# Patient Record
Sex: FEMALE | Race: WHITE | NOT HISPANIC OR LATINO | Employment: FULL TIME | URBAN - METROPOLITAN AREA
[De-identification: names, ages, dates, MRNs, and addresses within clinical notes are randomized per-mention and may not be internally consistent; named-entity substitution may affect disease eponyms.]

---

## 2017-03-28 PROBLEM — Z72.0 TOBACCO ABUSE: Status: ACTIVE | Noted: 2017-03-28

## 2017-07-08 PROCEDURE — 96372 THER/PROPH/DIAG INJ SC/IM: CPT

## 2017-07-08 PROCEDURE — 99284 EMERGENCY DEPT VISIT MOD MDM: CPT | Mod: 25

## 2017-07-09 ENCOUNTER — HOSPITAL ENCOUNTER (EMERGENCY)
Facility: HOSPITAL | Age: 22
Discharge: HOME | End: 2017-07-09
Attending: EMERGENCY MEDICINE
Payer: COMMERCIAL

## 2017-07-09 VITALS
SYSTOLIC BLOOD PRESSURE: 121 MMHG | OXYGEN SATURATION: 99 % | WEIGHT: 148 LBS | HEIGHT: 64 IN | TEMPERATURE: 98 F | DIASTOLIC BLOOD PRESSURE: 71 MMHG | BODY MASS INDEX: 25.27 KG/M2 | RESPIRATION RATE: 18 BRPM | HEART RATE: 72 BPM

## 2017-07-09 DIAGNOSIS — N76.0 BACTERIAL VAGINOSIS: Primary | ICD-10-CM

## 2017-07-09 DIAGNOSIS — B96.89 BACTERIAL VAGINOSIS: Primary | ICD-10-CM

## 2017-07-09 DIAGNOSIS — R10.2 PELVIC PAIN IN FEMALE: ICD-10-CM

## 2017-07-09 LAB
B-HCG UR QL: NEGATIVE
BACTERIA GENITAL QL WET PREP: ABNORMAL
BILIRUB UR QL STRIP: NEGATIVE
CLARITY UR: CLEAR
CLUE CELLS VAG QL WET PREP: ABNORMAL
COLOR UR: YELLOW
CTP QC/QA: YES
FILAMENT FUNGI VAG WET PREP-#/AREA: ABNORMAL
GLUCOSE UR QL STRIP: NEGATIVE
HGB UR QL STRIP: NEGATIVE
KETONES UR QL STRIP: NEGATIVE
LEUKOCYTE ESTERASE UR QL STRIP: NEGATIVE
NITRITE UR QL STRIP: NEGATIVE
PH UR STRIP: 5 [PH] (ref 5–8)
PROT UR QL STRIP: NEGATIVE
SP GR UR STRIP: 1.02 (ref 1–1.03)
SPECIMEN SOURCE: ABNORMAL
T VAGINALIS GENITAL QL WET PREP: ABNORMAL
URN SPEC COLLECT METH UR: NORMAL
UROBILINOGEN UR STRIP-ACNC: NEGATIVE EU/DL
WBC #/AREA VAG WET PREP: ABNORMAL
YEAST GENITAL QL WET PREP: ABNORMAL

## 2017-07-09 PROCEDURE — 87591 N.GONORRHOEAE DNA AMP PROB: CPT

## 2017-07-09 PROCEDURE — 63600175 PHARM REV CODE 636 W HCPCS: Performed by: NURSE PRACTITIONER

## 2017-07-09 PROCEDURE — 81025 URINE PREGNANCY TEST: CPT | Performed by: EMERGENCY MEDICINE

## 2017-07-09 PROCEDURE — 81003 URINALYSIS AUTO W/O SCOPE: CPT

## 2017-07-09 PROCEDURE — 87210 SMEAR WET MOUNT SALINE/INK: CPT

## 2017-07-09 RX ORDER — CEFTRIAXONE 500 MG/1
500 INJECTION, POWDER, FOR SOLUTION INTRAMUSCULAR; INTRAVENOUS
Status: COMPLETED | OUTPATIENT
Start: 2017-07-09 | End: 2017-07-09

## 2017-07-09 RX ORDER — DOXYCYCLINE 100 MG/1
100 CAPSULE ORAL 2 TIMES DAILY
Qty: 28 CAPSULE | Refills: 0 | Status: SHIPPED | OUTPATIENT
Start: 2017-07-09 | End: 2017-07-23

## 2017-07-09 RX ADMIN — CEFTRIAXONE SODIUM 500 MG: 500 INJECTION, POWDER, FOR SOLUTION INTRAMUSCULAR; INTRAVENOUS at 03:07

## 2017-07-09 NOTE — ED TRIAGE NOTES
Pt c/o lower abd pain x1 1/2 weeks, has had ovarian cysts in the past and states the pain is similar

## 2017-07-09 NOTE — DISCHARGE INSTRUCTIONS
Follow-up with your OB/GYN in the next several days for further evaluation and management.     Take antibiotic as prescribed until it is gone even if symptoms improve.    Return the emergency department for any new or worsening symptoms.

## 2017-07-10 LAB
C TRACH DNA SPEC QL NAA+PROBE: NOT DETECTED
N GONORRHOEA DNA SPEC QL NAA+PROBE: NOT DETECTED

## 2017-09-08 ENCOUNTER — HOSPITAL ENCOUNTER (OUTPATIENT)
Dept: PREADMISSION TESTING | Facility: HOSPITAL | Age: 22
Discharge: HOME | End: 2017-09-08
Attending: OBSTETRICS & GYNECOLOGY
Payer: COMMERCIAL

## 2017-09-08 VITALS
SYSTOLIC BLOOD PRESSURE: 103 MMHG | RESPIRATION RATE: 17 BRPM | BODY MASS INDEX: 26.88 KG/M2 | TEMPERATURE: 97 F | WEIGHT: 157.44 LBS | HEART RATE: 78 BPM | DIASTOLIC BLOOD PRESSURE: 70 MMHG | HEIGHT: 64 IN | OXYGEN SATURATION: 98 %

## 2017-09-08 DIAGNOSIS — Z01.818 PRE-OP TESTING: Primary | ICD-10-CM

## 2017-09-08 DIAGNOSIS — R10.2 PELVIC PAIN IN FEMALE: ICD-10-CM

## 2017-09-08 LAB
BASOPHILS # BLD AUTO: 0.05 K/UL
BASOPHILS NFR BLD: 0.6 %
DIFFERENTIAL METHOD: ABNORMAL
EOSINOPHIL # BLD AUTO: 0.4 K/UL
EOSINOPHIL NFR BLD: 5.5 %
ERYTHROCYTE [DISTWIDTH] IN BLOOD BY AUTOMATED COUNT: 11.9 %
HCT VFR BLD AUTO: 41.1 %
HGB BLD-MCNC: 14.3 G/DL
LYMPHOCYTES # BLD AUTO: 1.9 K/UL
LYMPHOCYTES NFR BLD: 23.5 %
MCH RBC QN AUTO: 31.6 PG
MCHC RBC AUTO-ENTMCNC: 34.8 G/DL
MCV RBC AUTO: 91 FL
MONOCYTES # BLD AUTO: 0.8 K/UL
MONOCYTES NFR BLD: 10.2 %
NEUTROPHILS # BLD AUTO: 4.9 K/UL
NEUTROPHILS NFR BLD: 60.1 %
PLATELET # BLD AUTO: 303 K/UL
PMV BLD AUTO: 10.2 FL
RBC # BLD AUTO: 4.52 M/UL
WBC # BLD AUTO: 8.07 K/UL

## 2017-09-08 PROCEDURE — 36415 COLL VENOUS BLD VENIPUNCTURE: CPT

## 2017-09-08 PROCEDURE — 85025 COMPLETE CBC W/AUTO DIFF WBC: CPT

## 2017-09-08 NOTE — DISCHARGE INSTRUCTIONS
Your surgery is scheduled for _Wednesday Sept 20, 2017___________________.    Call [phone] between 2 p.m. and 5 p.m. on   _Tuesday__ 9--18-17____________ to find out your arrival time for the day of your surgery.      Please report to SAME DAY SURGERY UNIT on the 2nd FLOOR at _______ a.m.  Use front door entrance. The doors open at 0530 am.          INSTRUCTIONS IMPORTANT!!!  ¨ Do not eat or drink after 12 midnight-including water. OK to brush teeth, no   gum, candy or mints!              _x___  Return to Hospital Lab on _Monday 9-18-17 at 9:00AM_____________for additional blood test.    _x___  Prep instructions:    SHOWER    _x___  Please shower using Hibiclens soap the night before AND  the morning of  your surgery/procedure. Do not use Hibiclens on your face or genitals       x        If your surgery is around your belly button (Navel) be sure to wash inside your  belly button also. Rinse hibiclens off completely.  _x___  No shaving of procedural area at least 4-5 days before surgery due to increased risk of skin irritation and/or possible infection.  _x___  Do not wear makeup, including mascara. WEARING EYE MAKEUP MAY  LEAD TO SERIOUS EYE INJURY during surgery.  _x___  No powder, lotions or creams to your body.  _x___  You may wear only deodorant on the day of surgery.  _x___  Please remove all jewelry, including piercings and leave at home.  _x___  No money or valuables needed. Please leave at home.  You may bring your cell phone.    _x___  If going home the same day, arrange for a ride home. You will not be able to drive if Anesthesia was used.    _x___  Wear loose fitting clothing. Allow for dressings, bandages.  x____  Stop Aspirin, Ibuprofen, Motrin and Aleve at least -5 days before surgery, unless otherwise instructed by your doctor, or the nurse.              You MAY use Tylenol/acetaminophen until day of surgery.  _x___  Call MD for temperature above 101 degrees.        _x___ Stop taking any Fish Oil  supplement or any Vitamins that contain Vitamin  E at least 5 days prior to surgery.              I have read or had read and explained to me, and understand the above information.  Additional comments or instructions:Please call   [phone] if you have any questions regarding the instructions above.

## 2017-09-08 NOTE — PRE-PROCEDURE INSTRUCTIONS
Pre-operative instructions, medication directives and pain scales reviewed with patient. Instructed to wash with Hibiclens prior to surgery as directed.  All questions the patient had  were answered. Re-assurance about surgical procedure and day of surgery routine given as needed. The patient verbalized understanding of the pre-op instructions.

## 2017-09-18 ENCOUNTER — LAB VISIT (OUTPATIENT)
Dept: LAB | Facility: HOSPITAL | Age: 22
End: 2017-09-18
Attending: OBSTETRICS & GYNECOLOGY
Payer: COMMERCIAL

## 2017-09-18 DIAGNOSIS — Z01.818 PRE-OP TESTING: ICD-10-CM

## 2017-09-18 LAB
ABO + RH BLD: NORMAL
BLD GP AB SCN CELLS X3 SERPL QL: NORMAL
HCG INTACT+B SERPL-ACNC: <1.2 MIU/ML

## 2017-09-18 PROCEDURE — 36415 COLL VENOUS BLD VENIPUNCTURE: CPT

## 2017-09-18 PROCEDURE — 86870 RBC ANTIBODY IDENTIFICATION: CPT

## 2017-09-18 PROCEDURE — 84702 CHORIONIC GONADOTROPIN TEST: CPT

## 2017-09-18 PROCEDURE — 86850 RBC ANTIBODY SCREEN: CPT

## 2017-09-18 PROCEDURE — 86900 BLOOD TYPING SEROLOGIC ABO: CPT | Mod: 91

## 2017-09-18 PROCEDURE — 86900 BLOOD TYPING SEROLOGIC ABO: CPT

## 2017-09-18 PROCEDURE — 86880 COOMBS TEST DIRECT: CPT

## 2017-09-18 PROCEDURE — 86905 BLOOD TYPING RBC ANTIGENS: CPT

## 2017-09-18 PROCEDURE — 86901 BLOOD TYPING SEROLOGIC RH(D): CPT | Mod: 91

## 2017-09-19 ENCOUNTER — ANESTHESIA EVENT (OUTPATIENT)
Dept: SURGERY | Facility: HOSPITAL | Age: 22
End: 2017-09-19
Payer: COMMERCIAL

## 2017-09-20 ENCOUNTER — SURGERY (OUTPATIENT)
Age: 22
End: 2017-09-20

## 2017-09-20 ENCOUNTER — ANESTHESIA (OUTPATIENT)
Dept: SURGERY | Facility: HOSPITAL | Age: 22
End: 2017-09-20
Payer: COMMERCIAL

## 2017-09-20 ENCOUNTER — HOSPITAL ENCOUNTER (OUTPATIENT)
Facility: HOSPITAL | Age: 22
Discharge: HOME | End: 2017-09-20
Attending: OBSTETRICS & GYNECOLOGY | Admitting: OBSTETRICS & GYNECOLOGY
Payer: COMMERCIAL

## 2017-09-20 VITALS
SYSTOLIC BLOOD PRESSURE: 107 MMHG | OXYGEN SATURATION: 98 % | WEIGHT: 157.38 LBS | BODY MASS INDEX: 26.87 KG/M2 | HEIGHT: 64 IN | TEMPERATURE: 98 F | RESPIRATION RATE: 16 BRPM | HEART RATE: 63 BPM | DIASTOLIC BLOOD PRESSURE: 79 MMHG

## 2017-09-20 DIAGNOSIS — R10.2 PELVIC PAIN IN FEMALE: ICD-10-CM

## 2017-09-20 PROCEDURE — 71000039 HC RECOVERY, EACH ADD'L HOUR: Performed by: OBSTETRICS & GYNECOLOGY

## 2017-09-20 PROCEDURE — 71000015 HC POSTOP RECOV 1ST HR: Performed by: OBSTETRICS & GYNECOLOGY

## 2017-09-20 PROCEDURE — 71000033 HC RECOVERY, INTIAL HOUR: Performed by: OBSTETRICS & GYNECOLOGY

## 2017-09-20 PROCEDURE — 25000003 PHARM REV CODE 250: Performed by: ANESTHESIOLOGY

## 2017-09-20 PROCEDURE — 36000709 HC OR TIME LEV III EA ADD 15 MIN: Performed by: OBSTETRICS & GYNECOLOGY

## 2017-09-20 PROCEDURE — 63600175 PHARM REV CODE 636 W HCPCS: Performed by: ANESTHESIOLOGY

## 2017-09-20 PROCEDURE — D9220A PRA ANESTHESIA: Mod: CRNA,,, | Performed by: NURSE ANESTHETIST, CERTIFIED REGISTERED

## 2017-09-20 PROCEDURE — 71000016 HC POSTOP RECOV ADDL HR: Performed by: OBSTETRICS & GYNECOLOGY

## 2017-09-20 PROCEDURE — 63600175 PHARM REV CODE 636 W HCPCS: Performed by: NURSE ANESTHETIST, CERTIFIED REGISTERED

## 2017-09-20 PROCEDURE — 37000009 HC ANESTHESIA EA ADD 15 MINS: Performed by: OBSTETRICS & GYNECOLOGY

## 2017-09-20 PROCEDURE — 25000003 PHARM REV CODE 250: Performed by: NURSE ANESTHETIST, CERTIFIED REGISTERED

## 2017-09-20 PROCEDURE — 25000003 PHARM REV CODE 250: Performed by: OBSTETRICS & GYNECOLOGY

## 2017-09-20 PROCEDURE — 37000008 HC ANESTHESIA 1ST 15 MINUTES: Performed by: OBSTETRICS & GYNECOLOGY

## 2017-09-20 PROCEDURE — D9220A PRA ANESTHESIA: Mod: ANES,,, | Performed by: ANESTHESIOLOGY

## 2017-09-20 PROCEDURE — 36000708 HC OR TIME LEV III 1ST 15 MIN: Performed by: OBSTETRICS & GYNECOLOGY

## 2017-09-20 RX ORDER — SODIUM CHLORIDE 0.9 % (FLUSH) 0.9 %
3 SYRINGE (ML) INJECTION
Status: DISCONTINUED | OUTPATIENT
Start: 2017-09-20 | End: 2017-09-20 | Stop reason: HOSPADM

## 2017-09-20 RX ORDER — MIDAZOLAM HYDROCHLORIDE 1 MG/ML
INJECTION, SOLUTION INTRAMUSCULAR; INTRAVENOUS
Status: DISCONTINUED | OUTPATIENT
Start: 2017-09-20 | End: 2017-09-20

## 2017-09-20 RX ORDER — DIPHENHYDRAMINE HCL 25 MG
25 CAPSULE ORAL EVERY 4 HOURS PRN
Status: DISCONTINUED | OUTPATIENT
Start: 2017-09-20 | End: 2017-09-20 | Stop reason: HOSPADM

## 2017-09-20 RX ORDER — ONDANSETRON 8 MG/1
8 TABLET, ORALLY DISINTEGRATING ORAL EVERY 8 HOURS PRN
Status: DISCONTINUED | OUTPATIENT
Start: 2017-09-20 | End: 2017-09-20 | Stop reason: HOSPADM

## 2017-09-20 RX ORDER — PROPOFOL 10 MG/ML
VIAL (ML) INTRAVENOUS
Status: DISCONTINUED | OUTPATIENT
Start: 2017-09-20 | End: 2017-09-20

## 2017-09-20 RX ORDER — SODIUM CHLORIDE, SODIUM LACTATE, POTASSIUM CHLORIDE, CALCIUM CHLORIDE 600; 310; 30; 20 MG/100ML; MG/100ML; MG/100ML; MG/100ML
INJECTION, SOLUTION INTRAVENOUS CONTINUOUS
Status: DISCONTINUED | OUTPATIENT
Start: 2017-09-20 | End: 2017-09-20 | Stop reason: HOSPADM

## 2017-09-20 RX ORDER — OXYCODONE AND ACETAMINOPHEN 10; 325 MG/1; MG/1
1 TABLET ORAL EVERY 4 HOURS PRN
Status: DISCONTINUED | OUTPATIENT
Start: 2017-09-20 | End: 2017-09-20 | Stop reason: HOSPADM

## 2017-09-20 RX ORDER — ONDANSETRON 2 MG/ML
INJECTION INTRAMUSCULAR; INTRAVENOUS
Status: DISCONTINUED | OUTPATIENT
Start: 2017-09-20 | End: 2017-09-20

## 2017-09-20 RX ORDER — HYDROMORPHONE HYDROCHLORIDE 2 MG/ML
0.2 INJECTION, SOLUTION INTRAMUSCULAR; INTRAVENOUS; SUBCUTANEOUS EVERY 5 MIN PRN
Status: DISCONTINUED | OUTPATIENT
Start: 2017-09-20 | End: 2017-09-20 | Stop reason: HOSPADM

## 2017-09-20 RX ORDER — OXYCODONE AND ACETAMINOPHEN 5; 325 MG/1; MG/1
1 TABLET ORAL EVERY 4 HOURS PRN
Qty: 12 TABLET | Refills: 0 | Status: SHIPPED | OUTPATIENT
Start: 2017-09-20 | End: 2017-09-27

## 2017-09-20 RX ORDER — GLYCOPYRROLATE 0.2 MG/ML
INJECTION INTRAMUSCULAR; INTRAVENOUS
Status: DISCONTINUED | OUTPATIENT
Start: 2017-09-20 | End: 2017-09-20

## 2017-09-20 RX ORDER — HYDROCODONE BITARTRATE AND ACETAMINOPHEN 5; 325 MG/1; MG/1
1 TABLET ORAL EVERY 4 HOURS PRN
Status: DISCONTINUED | OUTPATIENT
Start: 2017-09-20 | End: 2017-09-20 | Stop reason: HOSPADM

## 2017-09-20 RX ORDER — NEOSTIGMINE METHYLSULFATE 1 MG/ML
INJECTION, SOLUTION INTRAVENOUS
Status: DISCONTINUED | OUTPATIENT
Start: 2017-09-20 | End: 2017-09-20

## 2017-09-20 RX ORDER — LIDOCAINE HCL/PF 100 MG/5ML
SYRINGE (ML) INTRAVENOUS
Status: DISCONTINUED | OUTPATIENT
Start: 2017-09-20 | End: 2017-09-20

## 2017-09-20 RX ORDER — DIPHENHYDRAMINE HYDROCHLORIDE 50 MG/ML
25 INJECTION INTRAMUSCULAR; INTRAVENOUS EVERY 4 HOURS PRN
Status: DISCONTINUED | OUTPATIENT
Start: 2017-09-20 | End: 2017-09-20 | Stop reason: HOSPADM

## 2017-09-20 RX ORDER — LIDOCAINE HYDROCHLORIDE 10 MG/ML
1 INJECTION, SOLUTION EPIDURAL; INFILTRATION; INTRACAUDAL; PERINEURAL ONCE
Status: DISCONTINUED | OUTPATIENT
Start: 2017-09-20 | End: 2017-09-20 | Stop reason: HOSPADM

## 2017-09-20 RX ORDER — BUPIVACAINE HYDROCHLORIDE AND EPINEPHRINE 2.5; 5 MG/ML; UG/ML
INJECTION, SOLUTION EPIDURAL; INFILTRATION; INTRACAUDAL; PERINEURAL
Status: DISCONTINUED | OUTPATIENT
Start: 2017-09-20 | End: 2017-09-20 | Stop reason: HOSPADM

## 2017-09-20 RX ORDER — ROCURONIUM BROMIDE 10 MG/ML
INJECTION, SOLUTION INTRAVENOUS
Status: DISCONTINUED | OUTPATIENT
Start: 2017-09-20 | End: 2017-09-20

## 2017-09-20 RX ORDER — IBUPROFEN 600 MG/1
600 TABLET ORAL EVERY 6 HOURS PRN
Status: DISCONTINUED | OUTPATIENT
Start: 2017-09-20 | End: 2017-09-20 | Stop reason: HOSPADM

## 2017-09-20 RX ORDER — FENTANYL CITRATE 50 UG/ML
INJECTION, SOLUTION INTRAMUSCULAR; INTRAVENOUS
Status: DISCONTINUED | OUTPATIENT
Start: 2017-09-20 | End: 2017-09-20

## 2017-09-20 RX ORDER — METOCLOPRAMIDE HYDROCHLORIDE 5 MG/ML
INJECTION INTRAMUSCULAR; INTRAVENOUS
Status: DISCONTINUED | OUTPATIENT
Start: 2017-09-20 | End: 2017-09-20

## 2017-09-20 RX ORDER — SUCCINYLCHOLINE CHLORIDE 20 MG/ML
INJECTION INTRAMUSCULAR; INTRAVENOUS
Status: DISCONTINUED | OUTPATIENT
Start: 2017-09-20 | End: 2017-09-20

## 2017-09-20 RX ADMIN — FENTANYL CITRATE 100 MCG: 50 INJECTION INTRAMUSCULAR; INTRAVENOUS at 07:09

## 2017-09-20 RX ADMIN — ROCURONIUM BROMIDE 25 MG: 10 INJECTION, SOLUTION INTRAVENOUS at 07:09

## 2017-09-20 RX ADMIN — LIDOCAINE HYDROCHLORIDE 50 MG: 20 INJECTION, SOLUTION INTRAVENOUS at 07:09

## 2017-09-20 RX ADMIN — HYDROMORPHONE HYDROCHLORIDE 0.2 MG: 2 INJECTION, SOLUTION INTRAMUSCULAR; INTRAVENOUS; SUBCUTANEOUS at 08:09

## 2017-09-20 RX ADMIN — NEOSTIGMINE METHYLSULFATE 2.5 MG: 1 INJECTION INTRAVENOUS at 08:09

## 2017-09-20 RX ADMIN — ONDANSETRON 4 MG: 2 INJECTION, SOLUTION INTRAMUSCULAR; INTRAVENOUS at 06:09

## 2017-09-20 RX ADMIN — BUPIVACAINE HYDROCHLORIDE AND EPINEPHRINE BITARTRATE 7 ML: 2.5; .0091 INJECTION, SOLUTION EPIDURAL; INFILTRATION; INTRACAUDAL; PERINEURAL at 08:09

## 2017-09-20 RX ADMIN — SODIUM CHLORIDE, SODIUM LACTATE, POTASSIUM CHLORIDE, AND CALCIUM CHLORIDE: .6; .31; .03; .02 INJECTION, SOLUTION INTRAVENOUS at 07:09

## 2017-09-20 RX ADMIN — GLYCOPYRROLATE 0.2 MG: 0.2 INJECTION, SOLUTION INTRAMUSCULAR; INTRAVENOUS at 06:09

## 2017-09-20 RX ADMIN — ROCURONIUM BROMIDE 5 MG: 10 INJECTION, SOLUTION INTRAVENOUS at 07:09

## 2017-09-20 RX ADMIN — MIDAZOLAM HYDROCHLORIDE 2 MG: 1 INJECTION, SOLUTION INTRAMUSCULAR; INTRAVENOUS at 06:09

## 2017-09-20 RX ADMIN — METOCLOPRAMIDE 10 MG: 5 INJECTION, SOLUTION INTRAMUSCULAR; INTRAVENOUS at 06:09

## 2017-09-20 RX ADMIN — FENTANYL CITRATE 50 MCG: 50 INJECTION INTRAMUSCULAR; INTRAVENOUS at 07:09

## 2017-09-20 RX ADMIN — PROPOFOL 150 MG: 10 INJECTION, EMULSION INTRAVENOUS at 07:09

## 2017-09-20 RX ADMIN — SODIUM CHLORIDE, SODIUM LACTATE, POTASSIUM CHLORIDE, AND CALCIUM CHLORIDE: .6; .31; .03; .02 INJECTION, SOLUTION INTRAVENOUS at 06:09

## 2017-09-20 RX ADMIN — FENTANYL CITRATE 50 MCG: 50 INJECTION INTRAMUSCULAR; INTRAVENOUS at 08:09

## 2017-09-20 RX ADMIN — SUCCINYLCHOLINE CHLORIDE 100 MG: 20 INJECTION, SOLUTION INTRAMUSCULAR; INTRAVENOUS at 07:09

## 2017-09-20 RX ADMIN — GLYCOPYRROLATE 0.2 MG: 0.2 INJECTION, SOLUTION INTRAMUSCULAR; INTRAVENOUS at 08:09

## 2017-09-20 RX ADMIN — HYDROCODONE BITARTRATE AND ACETAMINOPHEN 1 TABLET: 5; 325 TABLET ORAL at 12:09

## 2017-09-20 NOTE — OP NOTE
GYN Operative Report    Date of Procedure: 17    Preoperative Diagonsis: Chronic pelvic pain    Postoperative Diagnosis: Same    Surgeon: Dr Anayeli Aden MD    Assistant: Dr Reggie Betts MD    Procedure: Diagnostic laparoscopy with lysis of adhesions     Indications: Ms Robins is a 23 yo  with chronic pelvic pain who has failed medical management.  She desired definitive surgical diagnosis.    EBL: < 5  cc    Findings: Exam under anesthesia revealed a normal sized uterus and adnexa with normal mobility.  Diagnostic laparoscopy revealed thin omental adhesions to the midline abdomen and in the right upper quadrant.  Thin adhesions in the lower uterine segment L>R. No endometriosis seen.    PROCEDURE DETAILS:   After giving informed consent, the patient was taken to the operating room with IV fluids running, and SCDs for DVT prophylaxis.  She was prepped and draped in the normal sterile fashion in the dorsal lithotomy position with adjustable Alpesh-type stirrups. Delvalle catheter was placed. A weighted speculum was placed. Cervix was identified, grasped with single-tooth tenaculum, and a Upsidelka uterine manipulator was inserted without difficulty. Gloves were changed.     Attention was turned to the abdomen. A 5 mm skin incision was made in the umbilicus. Upward traction was placed in the anterior abdominal wall. Veress needle was inserted. Double click was heard. Saline was noted to freely flow through the syringe. Beginning pressure of less than 5 mm Hg was obtained. Pneumoperitoneum was then established at pressure of 15 mmHg. Veress needle was removed. Upward traction was again placed in the anterior abdominal wall and a 10 mm trocar was placed into the abdomen under direct visualization with the laparoscope. Correct intraperitoneal placement was confirmed.  The abdominal wall, the vasculature and bowel under the insertion site was inspected and found to be free of any damage.  The abdomen and liver were noted to  have the above findings.  The patient was placed in Trendelenburg. The above mentioned pelvic findings were made.  A 5 mm trocar was placed in the lower right abdomen under direct laparoscopic visualization.  Using the Ace Harmonic Scalpel the omental adhesions in the abdomen were freed from the anterior abdominal wall.  Good hemostasis was noted.  All instruments were removed from the patient's abdomen.    Pneumoperitoneum was deflated and the skin was closed with 4-0 Moncryl.  All instruments were removed from the patient's uterus, vagina and bladder.    The patient tolerated the procedure well. All counts were correct. The patient was taken to Recovery Room in stable condition.

## 2017-09-20 NOTE — H&P (VIEW-ONLY)
History and Physical - GYN    Subjective:     Ms Robins is a 21 yo  who presents today for pre-operative evaluation for a diagnostic laparoscopy and possible resection of endometriosis.    Ms Robins has a long history of pelvic pain.  She has been on the Mirena, NuvaRing and combined OCPs, with relief initially, and then subsequently it stops working.  She does not have a histologic diagnosis of endometriosis, however, her clinical picture supports this diagnosis.    Review of Systems  Nine system ROS negative except for HPI    History reviewed. No pertinent past medical history.  Past Surgical History:   Procedure Laterality Date    APPENDECTOMY       SECTION      x2    CHOLECYSTECTOMY       OB History      Para Term  AB Living    2 2 2     2    SAB TAB Ectopic Multiple Live Births                     Social History     Social History    Marital status: Single     Spouse name: N/A    Number of children: N/A    Years of education: N/A     Occupational History    Not on file.     Social History Main Topics    Smoking status: Current Every Day Smoker     Packs/day: 0.50     Types: Cigarettes    Smokeless tobacco: Never Used    Alcohol use No    Drug use: No    Sexual activity: Yes     Partners: Male     Other Topics Concern    Not on file     Social History Narrative    No narrative on file     Family History   Problem Relation Age of Onset    Breast cancer Neg Hx     Colon cancer Neg Hx     Ovarian cancer Neg Hx        (Not in a hospital admission)  Review of patient's allergies indicates:  No Known Allergies     Objective:   Vitals: reviewed    General: no acute distress, alert and oriented to person, place and time  HEENT: head atraumatic, normocephalic, moist mucous membranes, neck supple, no large thyroid masses, no cervical or clavicular lymphadenopathy  Abdomen: non-distended, normoactive bowel sounds, non-tender to palpation, no masses, rebound or guarding  Incision(s):  umbilicus, RUQx3, RLQ open,  x2  Extremities: calves non-tender to palpation, no calf edema, erythema or palpable cords  Breasts: deferred    Assessment:     Encounter Diagnosis   Name Primary?    Pelvic pain in female Yes       Plan:   Long discussion had today with the patient in regards to her diagnosis of pelvic pain.  She understands that with most scenarios, these cases may be managed expectantly, medically or surgically.  She has failed multiple medical managements as outlined above and desires definitive surgical management.  She understands that with any surgical procedure there are many risks, including, but not limited to: bleeding, infection, damage to surrounding tissue(s), scar formation, need for additional procedure(s), fistula formation, blood clot formation in the form of DVT/PE, risk of myocardial infarction, brain trauma, death, anesthesia complication, so on and so forth. She understands the alternatives to this procedure are conservative management and medical management, however, desires to proceed.  Consents reviewed and signed today.      Contraception: OCPs    To the operating room for laparoscopy with possible resection of endometriosis and/or any indicated procedure.  With this procedure we are trying to obtain a diagnosis and hopefully get her some symptomatic relief of her pain.  She  Understands that if she has endometriosis this surgery is not definitive.  She understands that this is a diagnosis that spans her reproductive lifetime.  Additionally, with her many other surgeries, she understands there are increased risk of scar and/or damage to surrounding abdominal and pelvic structures.      To the OR  SCDs

## 2017-09-20 NOTE — DISCHARGE SUMMARY
Discharge Summary - GYN    Diagnosis: Chronic pelvic pain    Patient was admitted for her planned diagnostic laparoscopy.  She underwent a diagnostic laparoscopy with lysis of adhesions.  Please see H&P and operative report for full details.  She was transferred to the PACU and once she meets criteria, she will be discharged to home.    Disposition: Home    Condition: Stable    Diet: Regular    Medications:  Current Discharge Medication List      START taking these medications    Details   oxycodone-acetaminophen (PERCOCET) 5-325 mg per tablet Take 1 tablet by mouth every 4 (four) hours as needed for Pain.  Qty: 12 tablet, Refills: 0         CONTINUE these medications which have NOT CHANGED    Details   ibuprofen (ADVIL,MOTRIN) 800 MG tablet Take 1 tablet (800 mg total) by mouth every 8 (eight) hours as needed for Pain.  Qty: 30 tablet, Refills: 1      norgestimate-ethinyl estradiol (ORTHO-CYCLEN) 0.25-35 mg-mcg per tablet Take 1 tablet by mouth once daily.  Qty: 28 tablet, Refills: 11    Associated Diagnoses: Pelvic pain in female; Encounter for contraceptive management, unspecified type      triamcinolone acetonide 0.1% (KENALOG) 0.1 % cream Apply topically 2 (two) times daily.  Qty: 45 g, Refills: 1         STOP taking these medications       etonogestrel-ethinyl estradiol (NUVARING) 0.12-0.015 mg/24 hr vaginal ring Comments:   Reason for Stopping:               Activity: Pelvic rest, no heavy lifting    Follow-up: 1 week as already scheduled    Precautions: Patient counseled to return or call for temperature greater than 100.4, copious foul- smelling vaginal discharge, profuse vaginal bleeding, extreme pain, nausea or vomiting, wound breakdown, or any concerns.      Discharge Procedure Orders  Diet general

## 2017-09-20 NOTE — DISCHARGE INSTRUCTIONS
Last dose of pain medication given at 12:04 PM  BATHING:                   You may shower after your dressing is removed, but no tub baths, hot tubs, saunas or swimming until you see the doctor.    DRESSING:  ? Remove your bandage ____24____________. If there are skin tapes over the incision, leave them in place. They will start to come off in 5-7 days.  ACTIVITY LEVEL: If you have received sedation or an anesthetic, you may feel sleepy for   several hours. Rest until you are more awake. Gradually resume your normal activities  ? No heavy lifting or straining, nothing over 10 lbs., like a gallon of milk.  ? Pelvic rest- no sex, tampons or douching until follow up or instructed by doctor.  Activity: Pelvic rest, no heavy lifting     Follow-up: 1 week as already scheduled   Precautions-  call for temperature greater than 100.4, copious foul- smelling vaginal discharge, profuse vaginal bleeding, extreme pain, nausea or vomiting, wound breakdown, or any concerns.  DIET:  You may resume your home diet. If nausea is present, increase your diet gradually with fluids and bland foods.    Medications:  Pain medication should be taken only if needed and as directed. If antibiotics are prescribed, the medication should be taken until completed. You will be given an updated list of you medications.  ? No driving, alcoholic beverages or signing legal documents for next 24 hours or while taking pain medication    CALL THE DOCTOR:    For any obvious bleeding (some dried blood over the incision is normal).      Redness, swelling, foul smell around incision or fever over 101.   Shortness of breath, Coughing up Bloody Sputum or Pains or Swelling in your calves.   Persistent pain or nausea not relieved by medication.   If vaginal bleeding is in excess of a normal period.   Problems urinating    If any unusual problems or difficulties occur contact your doctor. If you cannot contact your doctor but feel your signs and symptoms  warrant a physicians attention return to the emergency room.  Fall Prevention  Millions of people fall every year and injure themselves. You may have had anesthesia or sedation which may increase your risk of falling. You may have health issues that put you at an increased risk of falling.     Here are ways to reduce your risk of falling.  ·   · Make your home safe by keeping walkways clear of objects you may trip over.  · Use non-slip pads under rugs. Do not use area rugs or small throw rugs.  · Use non-slip mats in bathtubs and showers.  · Install handrails and lights on staircases.  · Do not walk in poorly lit areas.  · Do not stand on chairs or wobbly ladders.  · Use caution when reaching overhead or looking upward. This position can cause a loss of balance.  · Be sure your shoes fit properly, have non-slip bottoms and are in good condition.   · Wear shoes both inside and out. Avoid going barefoot or wearing slippers.  · Be cautious when going up and down stairs, curbs, and when walking on uneven sidewalks.  · If your balance is poor, consider using a cane or walker.  · If your fall was related to alcohol use, stop or limit alcohol intake.   · If your fall was related to use of sleeping medicines, talk to your doctor about this. You may need to reduce your dosage at bedtime if you awaken during the night to go to the bathroom.    · To reduce the need for nighttime bathroom trips:  ¨ Avoid drinking fluids for several hours before going to bed  ¨ Empty your bladder before going to bed  ¨ Men can keep a urinal at the bedside  · Stay as active as you can. Balance, flexibility, strength, and endurance all come from exercise. They all play a role in preventing falls. Ask your healthcare provider which types of activity are right for you.  · Get your vision checked on a regular basis.  · If you have pets, know where they are before you stand up or walk so you don't trip over them.  · Use night lights.

## 2017-09-20 NOTE — INTERVAL H&P NOTE
The patient has been seen and examined and there are no changes to her H&P.  Ms Robins is a 21 yo  with h/o chronic pelvic pain and suspected endometriosis.  Her history is significant for  x2, appendectomy and cholescytectomy.    To the OR for laparoscopy for possible scar/endometrisos resection.  SCDs

## 2017-09-21 LAB — BLOOD GROUP ANTIBODIES SERPL: NORMAL

## 2017-09-21 NOTE — ANESTHESIA POSTPROCEDURE EVALUATION
"Anesthesia Post Evaluation    Patient: Aspen Robins    Procedure(s) Performed: Procedure(s) (LRB):  Lysis of Adhesions (N/A)    Final Anesthesia Type: general  Patient location during evaluation: PACU  Patient participation: Yes- Able to Participate  Level of consciousness: awake and alert  Post-procedure vital signs: reviewed and stable  Pain management: adequate  Airway patency: patent  PONV status at discharge: No PONV  Anesthetic complications: no      Cardiovascular status: blood pressure returned to baseline and hemodynamically stable  Respiratory status: unassisted  Hydration status: euvolemic  Follow-up not needed.        Visit Vitals  /79   Pulse 63   Temp 36.6 °C (97.8 °F) (Oral)   Resp 16   Ht 5' 4" (1.626 m)   Wt 71.4 kg (157 lb 6 oz)   LMP 08/20/2017 (Exact Date)   SpO2 98%   Breastfeeding? No   BMI 27.01 kg/m²       Pain/Kirsty Score: Pain Assessment Performed: Yes (9/20/2017 12:31 PM)  Presence of Pain: complains of pain/discomfort (9/20/2017 12:31 PM)  Pain Rating Prior to Med Admin: 5 (9/20/2017 12:31 PM)  Pain Rating Post Med Admin: 4 (9/20/2017 12:31 PM)  Kirsty Score: 10 (9/20/2017 12:31 PM)  Modified Kirsty Score: 19 (9/20/2017 12:31 PM)      "

## 2017-09-21 NOTE — ANESTHESIA PREPROCEDURE EVALUATION
09/21/2017  Aspen Robins is a 22 y.o., female.    Anesthesia Evaluation     I have reviewed the Nursing Notes.      Review of Systems  Social:  Non-Smoker   Cardiovascular:  Cardiovascular Normal Exercise tolerance: good     Pulmonary:  Pulmonary Normal    Renal/:  Renal/ Normal     Hepatic/GI:  Hepatic/GI Normal No Bowel Prep.    Neurological:  Neurology Normal    Endocrine:  Endocrine Normal        Physical Exam  General:  Well nourished    Airway/Jaw/Neck:  AIRWAY FINDINGS: Normal           Mental Status:  Mental Status Findings: Normal        Anesthesia Plan  Type of Anesthesia, risks & benefits discussed:  Anesthesia Type:  general  Patient's Preference:   Intra-op Monitoring Plan: standard ASA monitors  Intra-op Monitoring Plan Comments:   Post Op Pain Control Plan:   Post Op Pain Control Plan Comments:   Induction:   IV  Beta Blocker:  Patient is not currently on a Beta-Blocker (No further documentation required).       Informed Consent: Patient understands risks and agrees with Anesthesia plan.  Questions answered. Anesthesia consent signed with patient.  ASA Score: 2     Day of Surgery Review of History & Physical:    H&P update referred to the surgeon.         Ready For Surgery From Anesthesia Perspective.

## 2017-10-09 LAB — DAT IGG-SP REAG RBC-IMP: NORMAL

## 2017-11-15 LAB — PATHOLOGIST INTERPRETATION AB/XM: NORMAL

## 2019-07-27 NOTE — TRANSFER OF CARE
"Anesthesia Transfer of Care Note    Patient: Aspen Robins    Procedure(s) Performed: Procedure(s) (LRB):  Lysis of Adhesions (N/A)    Patient location: PACU    Anesthesia Type: general    Transport from OR: Transported from OR on room air with adequate spontaneous ventilation    Post pain: adequate analgesia    Post assessment: no apparent anesthetic complications and tolerated procedure well    Post vital signs: stable    Level of consciousness: awake, alert and oriented    Nausea/Vomiting: no nausea/vomiting    Complications: none    Transfer of care protocol was followed      Last vitals:   Visit Vitals  BP (!) 140/92   Pulse 66   Temp 36.3 °C (97.3 °F) (Oral)   Resp 16   Ht 5' 4" (1.626 m)   Wt 71.4 kg (157 lb 6 oz)   LMP 08/20/2017 (Exact Date)   SpO2 96%   Breastfeeding? No   BMI 27.01 kg/m²     "
pain/decreased strength

## 2019-10-04 PROBLEM — Z72.0 TOBACCO ABUSE: Status: RESOLVED | Noted: 2017-03-28 | Resolved: 2019-10-04

## 2019-10-04 PROBLEM — R10.2 PELVIC PAIN IN FEMALE: Status: RESOLVED | Noted: 2017-09-20 | Resolved: 2019-10-04

## 2020-03-16 ENCOUNTER — OFFICE VISIT (OUTPATIENT)
Dept: URGENT CARE | Facility: CLINIC | Age: 25
End: 2020-03-16
Payer: COMMERCIAL

## 2020-03-16 ENCOUNTER — HOSPITAL ENCOUNTER (EMERGENCY)
Facility: HOSPITAL | Age: 25
Discharge: HOME | End: 2020-03-16
Attending: EMERGENCY MEDICINE
Payer: COMMERCIAL

## 2020-03-16 VITALS
RESPIRATION RATE: 20 BRPM | TEMPERATURE: 99 F | WEIGHT: 146 LBS | HEIGHT: 64 IN | DIASTOLIC BLOOD PRESSURE: 86 MMHG | BODY MASS INDEX: 24.92 KG/M2 | OXYGEN SATURATION: 100 % | SYSTOLIC BLOOD PRESSURE: 146 MMHG | HEART RATE: 97 BPM

## 2020-03-16 VITALS
DIASTOLIC BLOOD PRESSURE: 88 MMHG | OXYGEN SATURATION: 100 % | SYSTOLIC BLOOD PRESSURE: 135 MMHG | BODY MASS INDEX: 24.92 KG/M2 | HEART RATE: 85 BPM | RESPIRATION RATE: 18 BRPM | WEIGHT: 146 LBS | HEIGHT: 64 IN | TEMPERATURE: 100 F

## 2020-03-16 DIAGNOSIS — S61.451A DOG BITE OF RIGHT HAND, INITIAL ENCOUNTER: Primary | ICD-10-CM
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
## 2020-03-16 LAB
B-HCG UR QL: NEGATIVE
CTP QC/QA: YES

## 2020-03-16 PROCEDURE — 73130 XR HAND COMPLETE 3 VIEW RIGHT: ICD-10-PCS | Mod: RT,S$GLB,, | Performed by: RADIOLOGY

## 2020-03-16 PROCEDURE — 73130 X-RAY EXAM OF HAND: CPT | Mod: RT,S$GLB,, | Performed by: RADIOLOGY

## 2020-03-16 PROCEDURE — 99284 EMERGENCY DEPT VISIT MOD MDM: CPT | Mod: 25

## 2020-03-16 PROCEDURE — 96375 TX/PRO/DX INJ NEW DRUG ADDON: CPT

## 2020-03-16 PROCEDURE — 81025 URINE PREGNANCY TEST: CPT | Performed by: EMERGENCY MEDICINE

## 2020-03-16 PROCEDURE — 99214 PR OFFICE/OUTPT VISIT, EST, LEVL IV, 30-39 MIN: ICD-10-PCS | Mod: S$GLB,,, | Performed by: FAMILY MEDICINE

## 2020-03-16 PROCEDURE — 99284 EMERGENCY DEPT VISIT MOD MDM: CPT | Mod: ,,, | Performed by: EMERGENCY MEDICINE

## 2020-03-16 PROCEDURE — 99214 OFFICE O/P EST MOD 30 MIN: CPT | Mod: S$GLB,,, | Performed by: FAMILY MEDICINE

## 2020-03-16 PROCEDURE — 96365 THER/PROPH/DIAG IV INF INIT: CPT

## 2020-03-16 PROCEDURE — 63600175 PHARM REV CODE 636 W HCPCS: Performed by: EMERGENCY MEDICINE

## 2020-03-16 PROCEDURE — 99284 PR EMERGENCY DEPT VISIT,LEVEL IV: ICD-10-PCS | Mod: ,,, | Performed by: EMERGENCY MEDICINE

## 2020-03-16 RX ORDER — MORPHINE SULFATE 2 MG/ML
6 INJECTION, SOLUTION INTRAMUSCULAR; INTRAVENOUS
Status: COMPLETED | OUTPATIENT
Start: 2020-03-16 | End: 2020-03-16

## 2020-03-16 RX ORDER — HYDROCODONE BITARTRATE AND ACETAMINOPHEN 5; 325 MG/1; MG/1
1 TABLET ORAL EVERY 6 HOURS PRN
Qty: 12 TABLET | Refills: 0 | Status: ON HOLD | OUTPATIENT
Start: 2020-03-16 | End: 2023-07-19 | Stop reason: CLARIF

## 2020-03-16 RX ORDER — AMOXICILLIN AND CLAVULANATE POTASSIUM 875; 125 MG/1; MG/1
1 TABLET, FILM COATED ORAL 2 TIMES DAILY
Qty: 14 TABLET | Refills: 0 | Status: ON HOLD | OUTPATIENT
Start: 2020-03-16 | End: 2023-07-19 | Stop reason: CLARIF

## 2020-03-16 RX ADMIN — MORPHINE SULFATE 6 MG: 2 INJECTION, SOLUTION INTRAMUSCULAR; INTRAVENOUS at 07:03

## 2020-03-16 RX ADMIN — AMPICILLIN AND SULBACTAM 3 G: 2; 1 INJECTION, POWDER, FOR SOLUTION INTRAVENOUS at 07:03

## 2020-03-16 NOTE — ED PROVIDER NOTES
Encounter Date: 3/16/2020    SCRIBE #1 NOTE: I, Hsanna Zackery, am scribing for, and in the presence of,  Xavier Storey. I have scribed the following portions of the note - Other sections scribed: STEVEN TAPIA PE.       History     Chief Complaint   Patient presents with    Animal Bite     r hand, sent from , had xray,      HPI   Ms. Robins is a 24 y.o. female with no significant past medical history, here today with puncture wound to right hand s/p animal bite from 1 PM today. Patient reports she was bit by her brother-in-law's pit bull after trying to stop him from biting her dog. Had x-ray at urgent care and was referred to ED for further evaluation. The dog's vaccination is UTP. Patient's last tetanus shot was 5 years ago. Taken nothing for pain. Denies other wound, fevers, chills, n/v.    Review of patient's allergies indicates:   Allergen Reactions    Sulfa (sulfonamide antibiotics)      Past Medical History:   Diagnosis Date    GERD (gastroesophageal reflux disease)      Past Surgical History:   Procedure Laterality Date    APPENDECTOMY       SECTION      x2    CHOLECYSTECTOMY      HERNIA REPAIR      with LINX procedure    PELVIC LAPAROSCOPY       Family History   Problem Relation Age of Onset    Breast cancer Neg Hx     Colon cancer Neg Hx     Ovarian cancer Neg Hx      Social History     Tobacco Use    Smoking status: Current Every Day Smoker     Packs/day: 0.50     Types: Cigarettes    Smokeless tobacco: Never Used   Substance Use Topics    Alcohol use: Yes     Comment: socialy    Drug use: No     Review of Systems   Constitutional: Negative for fever.   HENT: Negative for sore throat.    Respiratory: Negative for shortness of breath.    Cardiovascular: Negative for chest pain.   Gastrointestinal: Negative for nausea.   Genitourinary: Negative for dysuria.   Musculoskeletal: Negative for back pain.   Skin: Positive for wound. Negative for rash.   Neurological: Negative for weakness.    Hematological: Does not bruise/bleed easily.       Physical Exam     Initial Vitals [03/16/20 1835]   BP Pulse Resp Temp SpO2   (!) 149/95 92 18 99.8 °F (37.7 °C) 99 %      MAP       --         Physical Exam    Nursing note and vitals reviewed.  Constitutional: She appears well-developed and well-nourished. She is not diaphoretic. No distress.   HENT:   Head: Normocephalic and atraumatic.   Right Ear: External ear normal.   Left Ear: External ear normal.   Neck: Neck supple.   Cardiovascular: Normal rate, regular rhythm, normal heart sounds and intact distal pulses.   Pulmonary/Chest: Breath sounds normal. No respiratory distress. She has no wheezes. She has no rhonchi. She has no rales.   Abdominal: Soft. She exhibits no distension. There is no tenderness. There is no rebound and no guarding.   Musculoskeletal:   Unable to oppose her right thumb to her right pinky. Sensation is normal.    Neurological: She is alert and oriented to person, place, and time. No sensory deficit. GCS score is 15. GCS eye subscore is 4. GCS verbal subscore is 5. GCS motor subscore is 6.   Skin: Skin is warm. Capillary refill takes less than 2 seconds. Bruising noted. No rash noted.   Wound over the Sn[employer]box on right. On thenar eminence, there is a wound as well with tenderness to palpation over the thenar eminence with bruising and edema.    Psychiatric: She has a normal mood and affect.         ED Course   Procedures  Labs Reviewed   POCT URINE PREGNANCY          Imaging Results    None          Medical Decision Making:   History:   Old Medical Records: I decided to obtain old medical records.  Old Records Summarized: records from clinic visits.       <> Summary of Records: Seen in  for hand wound today  Independently Interpreted Test(s):   I have ordered and independently interpreted X-rays - see summary below.       <> Summary of X-Ray Reading(s): No acute fx of hand on my read. No obvious foreign bodies.  Clinical Tests:   Lab  Tests: Ordered and Reviewed  Radiological Study: Reviewed  ED Management:  Vitals normal. Afebrile. Here w/ right hand wound. Do not suspect tendon injury given location of wound. XR negative for fx. Tetanus up to date. IV morphine given for pain. 3 g Unasyn given. Discussed with ortho who will evaluate pt.    Ortho thoroughly irrigated the wounds and placed suture loosely on stuff box wound (not under my supervision). Advised for empiric antibiotics.    Rx for augmentin x 1 week, few days norco provided.   Referral placed for hand clinic in few days.    Stable for discharge at this time. Return precautions discussed.   Other:   I have discussed this case with another health care provider.       <> Summary of the Discussion: Orthopedic Surgery            Scribe Attestation:   Scribe #1: I performed the above scribed service and the documentation accurately describes the services I performed. I attest to the accuracy of the note.                          Clinical Impression:       ICD-10-CM ICD-9-CM   1. Dog bite of right hand, initial encounter S61.451A 882.0    W54.0XXA E906.0             ED Disposition Condition    Discharge Stable        ED Prescriptions     Medication Sig Dispense Start Date End Date Auth. Provider    HYDROcodone-acetaminophen (NORCO) 5-325 mg per tablet Take 1 tablet by mouth every 6 (six) hours as needed. 12 tablet 3/16/2020  Xavier Storey MD    amoxicillin-clavulanate 875-125mg (AUGMENTIN) 875-125 mg per tablet Take 1 tablet by mouth 2 (two) times daily. 14 tablet 3/16/2020  Xavier Storey MD        Follow-up Information     Follow up With Specialties Details Why Contact Info Additional Information    Primary Doctor No   As needed      [facility] Emergency Medicine  If symptoms worsen [address] [address] [phone]     [facility] [address] Orthopedics In 1 week  [address], [address]  [place]  [place] [address]  [phone] [facility], 9th Floor Please park in [address] and use [place] elevators                                     Xavier Storey MD  03/16/20 0441

## 2020-03-16 NOTE — PATIENT INSTRUCTIONS
Dog Bite  A dog bite can cause a wound deep enough to break the skin. In such cases, the wound is cleaned and sometimes closed. If the wound is closed, it is usually not completely closed. This is so that fluid can drain if the wound becomes infected. Often, wounds will be left open to heal. In addition to wound care, a tetanus shot may be given, if needed.    Home care  · Wash your hands well with soap and warm water before and after caring for the wound. This helps lower the risk of infection.  · Care for the wound as directed. If a dressing was applied to the wound, be sure to change it as directed.  · If the wound bleeds, place a clean, soft cloth on the wound. Then firmly apply pressure until the bleeding stops. This may take up to 5 minutes. Do not release the pressure and look at the wound during this time.  · Most wounds heal within 10 days. But an infection can occur even with proper treatment. So be sure to check the wound daily for signs of infection (see below).  · Antibiotics may be prescribed. These help prevent or treat infection. If youre given antibiotics, take them as directed. Also be sure to complete the medicines.  Rabies prevention  Rabies is a virus that can be carried in certain animals. These can include domestic animals such as dogs and cats. Pets fully vaccinated against rabies (2 shots) are at very low risk of infection. But because human rabies is almost always fatal, any biting pet should be confined for 10 days as an extra precaution. In general, if there is a risk for rabies, the following steps may need to be taken:  · If someones pet dog has bitten you, it should be kept in a secure area for the next 10 days to watch for signs of illness. (If the pet owner wont allow this, contact your local animal control center.) If the dog becomes ill or dies during that time, contact your local animal control center at once so the animal may be tested for rabies. If the dog stays healthy  for the next 10 days, there is no danger of rabies in the animal or you.  ¨ If a stray dog bit you, contact your local animal control center. They can give information on capture, quarantine, and animal rabies testing.  ¨ If you cant find the animal that bit you in the next 2 days, and if rabies exists in your area, you may need to receive the rabies vaccine series. Call your healthcare provider right away. Or, return to the emergency department promptly.  ¨ All animal bites should be reported to the local animal control center. If you were not given a form to fill out, you can report this yourself.  Follow-up care  Follow up with your healthcare provider, or as directed.  When to seek medical advice  Call your healthcare provider right away if any of these occur:  · Signs of infection:  ¨ Spreading redness or warmth from the wound  ¨ Increased pain or swelling  ¨ Fever of 100.4ºF (38ºC) or higher, or as directed by your healthcare provider  ¨ Colored fluid or pus draining from the wound  · Signs of rabies infection:  ¨ Headache  ¨ Confusion  ¨ Strange behavior  ¨ Increased salivating and drooling  ¨ Seizure  · Decreased ability to move any body part near the wound  · Bleeding that can't be stopped after 5 minutes of firm pressure  Date Last Reviewed: 3/1/2017  © 9[address] [employer] [address]. [address] [place] [address], [place] [address]. All rights reserved. This information is not intended as a substitute for professional medical care. Always follow your healthcare professional's instructions.

## 2020-03-16 NOTE — ED TRIAGE NOTES
"Pt states she was bit by her brother in laws dog.  States she went to urgent care and was sent here for "hand specialist".  Pt states she is unable to bend her thumb.  States wound is still bleeding slightly.    "

## 2020-03-16 NOTE — PROGRESS NOTES
"Subjective:       Patient ID: Aspen Robins is a 24 y.o. female.    Vitals:  height is 5' 4" (1.626 m) and weight is 66.2 kg (146 lb). Her oral temperature is 98.9 °F (37.2 °C). Her blood pressure is 146/86 (abnormal) and her pulse is 97. Her respiration is 20 and oxygen saturation is 100%.     Chief Complaint: Animal Bite    This is a 24 y.o. female   with Past Medical History:  No date: GERD (gastroesophageal reflux disease)   and Past Surgical History:  No date: APPENDECTOMY  No date:  SECTION      Comment:  x2  No date: CHOLECYSTECTOMY  No date: HERNIA REPAIR      Comment:  with LINX procedure  No date: PELVIC LAPAROSCOPY  who presents today with a chief complaint of a dog bite located on her right hand that happened today. Her hand is red and swollen. She cleaned the wound with peroxide and used neosporin to help relieve her symptoms.     Animal Bite    The incident occurred today. The incident occurred at home. There is an injury to the right hand and right wrist. Pertinent negatives include no abdominal pain, no light-headedness, no loss of consciousness and no weakness. There have been no prior injuries to these areas. She is right-handed. Her tetanus status is UTD. She has been behaving normally. There were no sick contacts.       Constitution: Negative for fatigue.   HENT: Negative for facial swelling and facial trauma.    Neck: Negative for neck stiffness.   Cardiovascular: Negative for chest trauma.   Eyes: Negative for eye trauma, double vision and blurred vision.   Gastrointestinal: Negative for abdominal trauma, abdominal pain and rectal bleeding.   Genitourinary: Negative for hematuria, missed menses, genital trauma and pelvic pain.   Musculoskeletal: Positive for pain and trauma. Negative for joint swelling and abnormal ROM of joint.   Skin: Positive for wound and erythema (unable to flex right thumb fully). Negative for color change, abrasion, laceration and bruising.   Neurological: Negative " for dizziness, history of vertigo, light-headedness, coordination disturbances, altered mental status and loss of consciousness.   Hematologic/Lymphatic: Negative for history of bleeding disorder.   Psychiatric/Behavioral: Negative for altered mental status.       Objective:      Physical Exam   Constitutional: She appears well-developed and well-nourished.   Cardiovascular: Normal rate and regular rhythm.   Pulmonary/Chest: Effort normal and breath sounds normal.   Skin: Lesions:  bruising (swelling of right thenar eminence with puncture wound noted) and erythema (unable to flex right thumb fully)  Nursing note and vitals reviewed.        Assessment:       1. Dog bite of right hand, initial encounter      r/o flexor injury right thumb  Plan:         Dog bite of right hand, initial encounter  -     X-Ray Hand 3 View Right; Future; Expected date: 03/16/2020  -     Refer to Emergency Dept.
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
## 2020-03-17 PROBLEM — S61.451A DOG BITE OF RIGHT HAND: Status: ACTIVE | Noted: 2020-03-17

## 2020-03-17 NOTE — ED NOTES
Adult Physical Assessment  LOC: Summer Robins, 24 y.o. female verified via two identifiers.  The patient is awake, alert, oriented and speaking appropriately at this time.  APPEARANCE: Patient resting comfortably and appears to be in no acute distress at this time. Patient is clean and well groomed, patient's clothing is properly fastened.  SKIN:The skin is warm and dry, color consistent with ethnicity, patient has normal skin turgor and moist mucus membranes. Laceration noted to R thumb, bleeding controlled. Rates pain 8 on 0-10 scale.  MUSCULOSKELETAL: Limited movement of R hand. Swelling noted to R thumb.  RESPIRATORY: Airway is open and patent, respirations are spontaneous, patient has a normal effort and rate, no accessory muscle use noted.  CARDIAC: Patient has a normal rate and rhythm, no periphreal edema noted in any extremity, capillary refill < 3 seconds in all extremities  ABDOMEN: Soft and non tender to palpation, no abdominal distention noted. Bowel sounds present in all four quadrants.  NEUROLOGIC: Eyes open spontaneously, behavior appropriate to situation, follows commands.

## 2020-03-17 NOTE — HPI
Patient is a 25 yo F with no significant PMH who presents with dog bite to right hand earlier today. It was pitbull. Known to her relative who owned it. Vaccines up to date on the animal. She has significant pain diffusely over the location of the bite over thenar hand. She denies fever or chills.

## 2020-03-17 NOTE — ASSESSMENT & PLAN NOTE
Patient is a 23 yo F with a dog bit injury to her right hand. No fractures or neurovascular deficits.  No signs of infection at this time however there is risk of this developing with these injuries. Irrigated in ER and loosely approximated dorsal, radial wrist laceration with one simple nylon suture.. She was given Unasyn in ER, tetanus is up to date and dog was known to be vaccinated against rabies. Will discharge on augmentin with close follow up in ortho hand clinic.Will call to schedule.

## 2020-03-17 NOTE — CONSULTS
[facility]  Orthopedics  Consult Note    Patient Name: Aspen Robins  MRN: [id number]  Admission Date: 3/16/2020  Hospital Length of Stay: 0 days  Attending Provider: No att. providers found  Primary Care Provider: Primary Doctor No    Patient information was obtained from patient and ER records.     Consults  Subjective:     Principal Problem:<principal problem not specified>    Chief Complaint:   Chief Complaint   Patient presents with    Animal Bite     r hand, sent from , had xray,         HPI: Patient is a 25 yo F with no significant PMH who presents with dog bite to right hand earlier today. It was pitbull. Known to her relative who owned it. Vaccines up to date on the animal. She has significant pain diffusely over the location of the bite over thenar hand. She denies fever or chills.     Past Medical History:   Diagnosis Date    GERD (gastroesophageal reflux disease)        Past Surgical History:   Procedure Laterality Date    APPENDECTOMY       SECTION      x2    CHOLECYSTECTOMY      HERNIA REPAIR      with LINX procedure    PELVIC LAPAROSCOPY         Review of patient's allergies indicates:   Allergen Reactions    Sulfa (sulfonamide antibiotics)        No current facility-administered medications for this encounter.      Current Outpatient Medications   Medication Sig    amitriptyline (ELAVIL) 25 MG tablet TK 1 T PO QHS    amoxicillin-clavulanate 875-125mg (AUGMENTIN) 875-125 mg per tablet Take 1 tablet by mouth 2 (two) times daily.    colestipol (COLESTID) 1 gram Tab Take 1 g by mouth.    esomeprazole (NEXIUM) 20 MG capsule Take 20 mg by mouth.    etonogestrel-ethinyl estradiol (NUVARING) 0.12-0.015 mg/24 hr vaginal ring INSERT 1 RING VAGINALLY AND LEAVE IN PLACE FOR 3 WEEKS THEN REMOVE FOR 1 WEEK (Patient not taking: Reported on 3/16/2020)    HYDROcodone-acetaminophen (NORCO) 5-325 mg per tablet Take 1 tablet by mouth every 6 (six) hours as needed.    MOTOFEN  "1-0.025 mg Tab TK 1 T PO  QID PRF DIARRHEA    pantoprazole (PROTONIX) 40 MG tablet TK 1 T PO BID 30 MINUTES  TO 1 HOUR B MEALS    triamcinolone acetonide 0.1% (KENALOG) 0.1 % cream Apply topically 2 (two) times daily.     Family History     None        Tobacco Use    Smoking status: Current Every Day Smoker     Packs/day: 0.50     Types: Cigarettes    Smokeless tobacco: Never Used   Substance and Sexual Activity    Alcohol use: Yes     Comment: socialy    Drug use: No    Sexual activity: Yes     Partners: Male     ROS See ER Provider ROS  Objective:     Vital Signs (Most Recent):  Temp: 99.6 °F (37.6 °C) (03/16/20 1931)  Pulse: 85 (03/16/20 1931)  Resp: 18 (03/16/20 1931)  BP: 135/88 (03/16/20 1931)  SpO2: 100 % (03/16/20 1931) Vital Signs (24h Range):  Temp:  [98.9 °F (37.2 °C)-99.8 °F (37.7 °C)] 99.6 °F (37.6 °C)  Pulse:  [85-97] 85  Resp:  [18-20] 18  SpO2:  [99 %-100 %] 100 %  BP: (135-149)/(86-95) 135/88     Weight: 66.2 kg (146 lb)  Height: 5' 4" (162.6 cm)  Body mass index is 25.06 kg/m².      Intake/Output Summary (Last 24 hours) at 3/17/2020 0807  Last data filed at 3/16/2020 2115  Gross per 24 hour   Intake 100 ml   Output --   Net 100 ml       Ortho/SPM Exam    RUE:  Swelling over thenar eminence  Punctate bite wound over thenar eminence with laceration into sub q from bite over dorsal radial wrist as well as a more superficial laceration located over 3rd metacarpal shaft  Very TTP diffusely over thenar eminence and dorsal, radial hand  FROM shoulder, elbow and wrist  Able to extend thumb and flex thumb fully. Limited opposition in the setting of significant thenar swelling obstructing this as well as felt to be pain limited  SILT M/U/R  Motor intact AIN/PIN/M/U/R   Cap refill < 2s  2+ RP      Significant Labs: All pertinent labs within the past 24 hours have been reviewed.    Significant Imaging: I have reviewed and interpreted all pertinent imaging results/findings.   XR R hand shows no fracture " or dislocations    Assessment/Plan:     Dog bite of right hand  Patient is a 23 yo F with a dog bit injury to her right hand. No fractures or neurovascular deficits.  No signs of infection at this time however there is risk of this developing with these injuries. Irrigated in ER and loosely approximated dorsal, radial wrist laceration with one simple nylon suture.. She was given Unasyn in ER, tetanus is up to date and dog was known to be vaccinated against rabies. Will discharge on augmentin with close follow up in ortho hand clinic.Will call to schedule.              Ambrose Crespo MD  Orthopedics  [facility]

## 2020-03-17 NOTE — SUBJECTIVE & OBJECTIVE
Past Medical History:   Diagnosis Date    GERD (gastroesophageal reflux disease)        Past Surgical History:   Procedure Laterality Date    APPENDECTOMY       SECTION      x2    CHOLECYSTECTOMY      HERNIA REPAIR      with LINX procedure    PELVIC LAPAROSCOPY         Review of patient's allergies indicates:   Allergen Reactions    Sulfa (sulfonamide antibiotics)        No current facility-administered medications for this encounter.      Current Outpatient Medications   Medication Sig    amitriptyline (ELAVIL) 25 MG tablet TK 1 T PO QHS    amoxicillin-clavulanate 875-125mg (AUGMENTIN) 875-125 mg per tablet Take 1 tablet by mouth 2 (two) times daily.    colestipol (COLESTID) 1 gram Tab Take 1 g by mouth.    esomeprazole (NEXIUM) 20 MG capsule Take 20 mg by mouth.    etonogestrel-ethinyl estradiol (NUVARING) 0.12-0.015 mg/24 hr vaginal ring INSERT 1 RING VAGINALLY AND LEAVE IN PLACE FOR 3 WEEKS THEN REMOVE FOR 1 WEEK (Patient not taking: Reported on 3/16/2020)    HYDROcodone-acetaminophen (NORCO) 5-325 mg per tablet Take 1 tablet by mouth every 6 (six) hours as needed.    MOTOFEN 1-0.025 mg Tab TK 1 T PO  QID PRF DIARRHEA    pantoprazole (PROTONIX) 40 MG tablet TK 1 T PO BID 30 MINUTES  TO 1 HOUR B MEALS    triamcinolone acetonide 0.1% (KENALOG) 0.1 % cream Apply topically 2 (two) times daily.     Family History     None        Tobacco Use    Smoking status: Current Every Day Smoker     Packs/day: 0.50     Types: Cigarettes    Smokeless tobacco: Never Used   Substance and Sexual Activity    Alcohol use: Yes     Comment: socialy    Drug use: No    Sexual activity: Yes     Partners: Male     ROS See ER Provider ROS  Objective:     Vital Signs (Most Recent):  Temp: 99.6 °F (37.6 °C) (20)  Pulse: 85 (20)  Resp: 18 (20)  BP: 135/88 (20)  SpO2: 100 % (20) Vital Signs (24h Range):  Temp:  [98.9 °F (37.2 °C)-99.8 °F (37.7 °C)] 99.6 °F (37.6  "°C)  Pulse:  [85-97] 85  Resp:  [18-20] 18  SpO2:  [99 %-100 %] 100 %  BP: (135-149)/(86-95) 135/88     Weight: 66.2 kg (146 lb)  Height: 5' 4" (162.6 cm)  Body mass index is 25.06 kg/m².      Intake/Output Summary (Last 24 hours) at 3/17/2020 0807  Last data filed at 3/16/2020 2115  Gross per 24 hour   Intake 100 ml   Output --   Net 100 ml       Ortho/SPM Exam    RUE:  Swelling over thenar eminence  Punctate bite wound over thenar eminence with laceration into sub q from bite over dorsal radial wrist as well as a more superficial laceration located over 3rd metacarpal shaft  Very TTP diffusely over thenar eminence and dorsal, radial hand  FROM shoulder, elbow and wrist  Able to extend thumb and flex thumb fully. Limited opposition in the setting of significant thenar swelling obstructing this as well as felt to be pain limited  SILT M/U/R  Motor intact AIN/PIN/M/U/R   Cap refill < 2s  2+ RP      Significant Labs: All pertinent labs within the past 24 hours have been reviewed.    Significant Imaging: I have reviewed and interpreted all pertinent imaging results/findings.   XR R hand shows no fracture or dislocations  "

## 2020-03-18 ENCOUNTER — TELEPHONE (OUTPATIENT)
Dept: ORTHOPEDICS | Facility: CLINIC | Age: 25
End: 2020-03-18

## 2020-03-18 NOTE — TELEPHONE ENCOUNTER
----- Message from Ambrose Crespo MD sent at 3/17/2020 10:11 AM CDT -----  Please schedule this patient for Wednesday or Thursday follow up. Patient with dog bite to hand. Consult discussed with Dr. Sharp with close outpatient follow up. Thanks

## 2020-05-05 ENCOUNTER — OFFICE VISIT (OUTPATIENT)
Dept: URGENT CARE | Facility: CLINIC | Age: 25
End: 2020-05-05
Payer: COMMERCIAL

## 2020-05-05 VITALS
WEIGHT: 143 LBS | TEMPERATURE: 99 F | OXYGEN SATURATION: 100 % | HEART RATE: 105 BPM | HEIGHT: 64 IN | BODY MASS INDEX: 24.41 KG/M2

## 2020-05-05 DIAGNOSIS — Z20.822 SUSPECTED COVID-19 VIRUS INFECTION: Primary | ICD-10-CM

## 2020-05-05 PROCEDURE — 99212 PR OFFICE/OUTPT VISIT, EST, LEVL II, 10-19 MIN: ICD-10-PCS | Mod: S$GLB,,, | Performed by: STUDENT IN AN ORGANIZED HEALTH CARE EDUCATION/TRAINING PROGRAM

## 2020-05-05 PROCEDURE — U0002 COVID-19 LAB TEST NON-CDC: HCPCS

## 2020-05-05 PROCEDURE — 99212 OFFICE O/P EST SF 10 MIN: CPT | Mod: S$GLB,,, | Performed by: STUDENT IN AN ORGANIZED HEALTH CARE EDUCATION/TRAINING PROGRAM

## 2020-05-05 NOTE — LETTER
[address] ? [place], [address] ? Phone [phone] ? Fax [phone] ? [email or web address]          Return to Work/School    Patient: Aspen Robins  YOB: 1995   Date: 05/05/2020      To Whom It May Concern:     Aspen Robins was in contact with/seen in my office on 05/05/2020. COVID-19 is present in our communities across the state. There is limited testing for COVID at this time, so not all patients can be tested. In this situation, your employee meets the following criteria:     Aspen Robins has met the criteria for COVID-19 testing based upon symptoms, travel, and/or potential exposure. The test has been completed and is pending results at this time. During this time the employee is not able to work and should be quarantined per the Centers for Disease Control timelines.      If you have any questions or concerns, or if I can be of further assistance, please do not hesitate to contact me.     Sincerely,    Manuel Li MD

## 2020-05-05 NOTE — PROGRESS NOTES
"Subjective:       Patient ID: Aspen Robins is a 24 y.o. female.    Vitals:  height is 5' 4" (1.626 m) and weight is 64.9 kg (143 lb). Her oral temperature is 98.6 °F (37 °C). Her pulse is 105. Her oxygen saturation is 100%.     Chief Complaint: Headache    Pt presents for COVID sx's. Reports  was positive for Covid-19 on Saturday and she began having fatigue, HA, body aches, chest tightness, sore throat, and dry cough on Sunday. Denied f/c, SoB, Fay, CP, emesis, or abd pain.    Headache    This is a new problem. The current episode started in the past 7 days (3 days). The problem occurs daily. The problem has been gradually worsening. The pain is located in the frontal region. The pain radiates to the face. The pain quality is not similar to prior headaches. The quality of the pain is described as aching. The pain is moderate. Associated symptoms include coughing, muscle aches, nausea and a sore throat. Pertinent negatives include no dizziness, ear pain, eye redness, fever, neck pain, numbness, photophobia, sinus pressure, vomiting or weakness. The symptoms are aggravated by coughing. She has tried nothing for the symptoms.       Constitution: Positive for fatigue. Negative for chills, sweating and fever.   HENT: Positive for sore throat. Negative for ear pain, congestion, sinus pain, sinus pressure and voice change.    Neck: Negative for neck pain, neck stiffness and painful lymph nodes.   Cardiovascular: Negative for chest pain, palpitations and sob on exertion.   Eyes: Negative for eye discharge, eye redness and photophobia.   Respiratory: Positive for chest tightness and cough. Negative for sputum production, bloody sputum, COPD, shortness of breath, stridor, wheezing and asthma.    Gastrointestinal: Positive for nausea. Negative for vomiting.   Musculoskeletal: Positive for muscle ache. Negative for joint pain and joint swelling.   Skin: Negative for color change, rash and lesion.   Allergic/Immunologic: " "Negative for seasonal allergies and asthma.   Neurological: Positive for headaches. Negative for dizziness, light-headedness and numbness.   Hematologic/Lymphatic: Negative for swollen lymph nodes.   Psychiatric/Behavioral: Negative for confusion, agitation and sleep disturbance.       Objective:       Vitals:    05/05/20 1710   Pulse: 105   Temp: 98.6 °F (37 °C)   TempSrc: Oral   SpO2: 100%   Weight: 64.9 kg (143 lb)   Height: 5' 4" (1.626 m)     Physical Exam   Constitutional: She is oriented to person, place, and time. She appears well-developed and well-nourished. No distress.   HENT:   Head: Normocephalic and atraumatic.   Right Ear: External ear normal.   Left Ear: External ear normal.   Eyes: Conjunctivae and EOM are normal. Right eye exhibits no discharge. Left eye exhibits no discharge.   Cardiovascular: Normal rate, regular rhythm and normal heart sounds. Exam reveals no friction rub.   No murmur heard.  Pulmonary/Chest: Effort normal and breath sounds normal. No stridor. No respiratory distress. She has no wheezes. She has no rales.   Neurological: She is alert and oriented to person, place, and time. No cranial nerve deficit (CN II-XII GI).   Skin: Skin is warm, dry and no rash.   Psychiatric: She has a normal mood and affect. Her behavior is normal. Judgment and thought content normal.         Assessment:       1. Suspected Covid-19 Virus Infection        Plan:         Suspected Covid-19 Virus Infection  -     COVID-19 Routine Screening  - counseled patient on home isolation protocols pending test results, questions answered and return precautions given    Follow up if symptoms worsen or fail to improve.    Manuel Li MD/MPH  [facility]  [facility]       "

## 2020-05-05 NOTE — PATIENT INSTRUCTIONS
Instructions for Patients with Confirmed or Suspected COVID-19    If you are awaiting your test result, you will either be called or it will be released to the patient portal.  If you have any questions about your test, please visit [email or web address] or call our COVID-19 information line at [phone].       Stay home and stay away from family members and friends. The CDC says, you can leave home after these three things have happened: 1) You have had no fever for at least 72 hours (that is three full days of no fever without the use of medicine that reduces fevers) 2) AND other symptoms have improved (for example, when your cough or shortness of breath have improved) 3) AND at least 7 days have passed since your symptoms first appeared.   Separate yourself from other people and animals in your home.   Call ahead before visiting your doctor.   Wear a facemask.   Cover your coughs and sneezes.   Wash your hands often with soap and water; hand  can be used, too.   Avoid sharing personal household items.   Wipe down surfaces used daily.   Monitor your symptoms. Seek prompt medical attention if your illness is worsening (e.g., difficulty breathing).    Before seeking care, call your healthcare provider.   If you have a medical emergency and need to call 911, notify the dispatch personnel that you have, or are being evaluated for COVID-19. If possible, put on a facemask before emergency medical services arrive.        Recommended precautions for household members, intimate partners, and caregivers in a home setting of a patient with symptomatic laboratory-confirmed COVID-19 or a patient under investigation.  Household members, intimate partners, and caregivers in the home setting awaiting tests results have close contact with a person with symptomatic, laboratory-confirmed COVID-19 or a person under investigation. Close contacts should monitor their health; they should call their  provider right away if they develop symptoms suggestive of COVID-19 (e.g., fever, cough, shortness of breath).    Close contacts should also follow these recommendations:   Make sure that you understand and can help the patient follow their provider's instructions for medication(s) and care. You should help the patient with basic needs in the home and provide support for getting groceries, prescriptions, and other personal needs.   Monitor the patient's symptoms. If the patient is getting sicker, call his or her healthcare provider and tell them that the patient has laboratory-confirmed COVID-19. If the patient has a medical emergency and you need to call 911, notify the dispatch personnel that the patient has, or is being evaluated for COVID-19.   Household members should stay in another room or be  from the patient. Household members should use a separate bedroom and bathroom, if available.   Prohibit visitors.   Household members should care for any pets in the home.   Make sure that shared spaces in the home have good air flow, such as by an air conditioner or an opened window, weather permitting.   Perform hand hygiene frequently. Wash your hands often with soap and water for at least 20 seconds or use an alcohol-based hand  (that contains > 60% alcohol) covering all surfaces of your hands and rubbing them together until they feel dry. Soap and water should be used preferentially.   Avoid touching your eyes, nose, and mouth.   The patient should wear a facemask. If the patient is not able to wear a facemask (for example, because it causes trouble breathing), caregivers should wear a mask when they are in the same room as the patient.   Wear a disposable facemask and gloves when you touch or have contact with the patient's blood, stool, or body fluids, such as saliva, sputum, nasal mucus, vomit, urine.  o Throw out disposable facemasks and gloves after using them. Do not  reuse.  o When removing personal protective equipment, first remove and dispose of gloves. Then, immediately clean your hands with soap and water or alcohol-based hand . Next, remove and dispose of facemask, and immediately clean your hands again with soap and water or alcohol-based hand .   You should not share dishes, drinking glasses, cups, eating utensils, towels, bedding, or other items with the patient. After the patient uses these items, you should wash them thoroughly (see below Wash laundry thoroughly).   Clean all high-touch surfaces, such as counters, tabletops, doorknobs, bathroom fixtures, toilets, phones, keyboards, tablets, and bedside tables, every day. Also, clean any surfaces that may have blood, stool, or body fluids on them.   Use a household cleaning spray or wipe, according to the label instructions. Labels contain instructions for safe and effective use of the cleaning product including precautions you should take when applying the product, such as wearing gloves and making sure you have good ventilation during use of the product.   Wash laundry thoroughly.  o Immediately remove and wash clothes or bedding that have blood, stool, or body fluids on them.  o Wear disposable gloves while handling soiled items and keep soiled items away from your body. Clean your hands (with soap and water or an alcohol-based hand ) immediately after removing your gloves.  o Read and follow directions on labels of laundry or clothing items and detergent. In general, using a normal laundry detergent according to washing machine instructions and dry thoroughly using the warmest temperatures recommended on the clothing label.   Place all used disposable gloves, facemasks, and other contaminated items in a lined container before disposing of them with other household waste. Clean your hands (with soap and water or an alcohol-based hand ) immediately after handling these  items. Soap and water should be used preferentially if hands are visibly dirty.   Discuss any additional questions with your state or local health department or healthcare provider. Check available hours when contacting your local health department.    For more information see CDC link below.      https://www.cdc.gov/coronavirus/2019-ncov/hcp/guidance-prevent-spread.html#precautions        Sources:  [facility], [facility] and [facility]

## 2020-05-09 ENCOUNTER — TELEPHONE (OUTPATIENT)
Dept: URGENT CARE | Facility: CLINIC | Age: 25
End: 2020-05-09

## 2020-05-09 NOTE — TELEPHONE ENCOUNTER
Patient called regarding covid-19 test results. Patient was tested 05/05/20 and still had not received results. [facility] and was told that they do have the specimen but have been running behind on resulting test because they have been having an influx of test and that the test should be resulted tomorrow. I called patient back and apologized for wait and let her know she should have test results tomorrow.

## 2020-05-10 ENCOUNTER — TELEPHONE (OUTPATIENT)
Dept: URGENT CARE | Facility: CLINIC | Age: 25
End: 2020-05-10

## 2020-05-10 NOTE — TELEPHONE ENCOUNTER
Patient called stating she was tested for covid on 05/05 and still has not received the results. Advised patient lab was still listed as in process and that I would contact the lab to check the status. On review of the chart saw that patient had called yesterday and Katie had called the lab then too. Katie states she was told they had the specimen but that they were backed up with tests and it should be resulted tomorrow (05/10/2020). I contacted the lab today, the  could see that specimen was received on 05/05/2020 at 8.40pm but could not see any comments or notes on the order giving reason for delay.  tried to contact [facility] but area is not staffed on sundays states no Covid-19 test are processed on sundays.  stated he would pass this on to tomorrows AM shift to check why the test has not been ran and will have someone contact Supervisor (marv) with response.

## 2020-05-11 ENCOUNTER — CLINICAL SUPPORT (OUTPATIENT)
Dept: URGENT CARE | Facility: CLINIC | Age: 25
End: 2020-05-11
Payer: COMMERCIAL

## 2020-05-11 DIAGNOSIS — R05.9 COUGH: Primary | ICD-10-CM

## 2020-05-11 PROCEDURE — U0002 COVID-19 LAB TEST NON-CDC: HCPCS

## 2020-05-11 NOTE — TELEPHONE ENCOUNTER
Spoke with Lab at [facility] they said they do not have her specimen. The lab states the patient will be refunded for prior test.     Spoke with patient she will come in today to be re-swabbed as a nurse only visit. Patient agreed and will come in today to be swabbed again.

## 2020-05-12 ENCOUNTER — TELEPHONE (OUTPATIENT)
Dept: URGENT CARE | Facility: CLINIC | Age: 25
End: 2020-05-12

## 2020-05-12 LAB — SARS-COV-2 RNA RESP QL NAA+PROBE: NOT DETECTED

## 2020-05-12 NOTE — TELEPHONE ENCOUNTER
Attempted to call patient to notify about negative COVID-19 results.  No answer left voicemail to call back.

## 2022-12-01 ENCOUNTER — OFFICE VISIT (OUTPATIENT)
Dept: URGENT CARE | Facility: CLINIC | Age: 27
End: 2022-12-01
Payer: COMMERCIAL

## 2022-12-01 VITALS
TEMPERATURE: 98 F | HEART RATE: 66 BPM | SYSTOLIC BLOOD PRESSURE: 118 MMHG | WEIGHT: 167 LBS | RESPIRATION RATE: 20 BRPM | DIASTOLIC BLOOD PRESSURE: 79 MMHG | OXYGEN SATURATION: 98 %

## 2022-12-01 DIAGNOSIS — H65.02 ACUTE SEROUS OTITIS MEDIA OF LEFT EAR, RECURRENCE NOT SPECIFIED: ICD-10-CM

## 2022-12-01 DIAGNOSIS — H60.392 OTHER INFECTIVE OTITIS EXTERNA, LEFT EAR: Primary | ICD-10-CM

## 2022-12-01 PROCEDURE — 1159F PR MEDICATION LIST DOCUMENTED IN MEDICAL RECORD: ICD-10-PCS | Mod: CPTII,S$GLB,, | Performed by: PHYSICIAN ASSISTANT

## 2022-12-01 PROCEDURE — 99202 PR OFFICE/OUTPT VISIT, NEW, LEVL II, 15-29 MIN: ICD-10-PCS | Mod: S$GLB,,, | Performed by: PHYSICIAN ASSISTANT

## 2022-12-01 PROCEDURE — 3074F PR MOST RECENT SYSTOLIC BLOOD PRESSURE < 130 MM HG: ICD-10-PCS | Mod: CPTII,S$GLB,, | Performed by: PHYSICIAN ASSISTANT

## 2022-12-01 PROCEDURE — 1159F MED LIST DOCD IN RCRD: CPT | Mod: CPTII,S$GLB,, | Performed by: PHYSICIAN ASSISTANT

## 2022-12-01 PROCEDURE — 3078F PR MOST RECENT DIASTOLIC BLOOD PRESSURE < 80 MM HG: ICD-10-PCS | Mod: CPTII,S$GLB,, | Performed by: PHYSICIAN ASSISTANT

## 2022-12-01 PROCEDURE — 99202 OFFICE O/P NEW SF 15 MIN: CPT | Mod: S$GLB,,, | Performed by: PHYSICIAN ASSISTANT

## 2022-12-01 PROCEDURE — 3078F DIAST BP <80 MM HG: CPT | Mod: CPTII,S$GLB,, | Performed by: PHYSICIAN ASSISTANT

## 2022-12-01 PROCEDURE — 1160F RVW MEDS BY RX/DR IN RCRD: CPT | Mod: CPTII,S$GLB,, | Performed by: PHYSICIAN ASSISTANT

## 2022-12-01 PROCEDURE — 3074F SYST BP LT 130 MM HG: CPT | Mod: CPTII,S$GLB,, | Performed by: PHYSICIAN ASSISTANT

## 2022-12-01 PROCEDURE — 1160F PR REVIEW ALL MEDS BY PRESCRIBER/CLIN PHARMACIST DOCUMENTED: ICD-10-PCS | Mod: CPTII,S$GLB,, | Performed by: PHYSICIAN ASSISTANT

## 2022-12-01 RX ORDER — CIPROFLOXACIN AND DEXAMETHASONE 3; 1 MG/ML; MG/ML
4 SUSPENSION/ DROPS AURICULAR (OTIC) 2 TIMES DAILY
Qty: 7.5 ML | Refills: 0 | Status: SHIPPED | OUTPATIENT
Start: 2022-12-01 | End: 2022-12-08

## 2022-12-01 RX ORDER — AMOXICILLIN 500 MG/1
500 TABLET, FILM COATED ORAL EVERY 12 HOURS
Qty: 20 TABLET | Refills: 0 | Status: SHIPPED | OUTPATIENT
Start: 2022-12-01 | End: 2022-12-11

## 2022-12-01 NOTE — PATIENT INSTRUCTIONS
Use ear drops in left ear for one week. Take the antibiotics as prescribed with food. Avoid using qtips. Use tylenol/ibuprofen as needed for pain relief/fevers. If your symptoms should worsen please return to the urgent care or ED as needed.

## 2022-12-01 NOTE — PROGRESS NOTES
Subjective:       Patient ID: Aspen Cheng is a 27 y.o. female.    Vitals:  weight is 75.8 kg (167 lb). Her temperature is 98 °F (36.7 °C). Her blood pressure is 118/79 and her pulse is 66. Her respiration is 20 and oxygen saturation is 98%.     Chief Complaint: Otalgia    Pt is complaining of ear pain that started Tuesday. She is having some minor hearing loss.     Otalgia   There is pain in the left ear. This is a new problem. The current episode started in the past 7 days. There has been no fever. The pain is at a severity of 7/10. Associated symptoms include hearing loss and a sore throat. Pertinent negatives include no abdominal pain, coughing, diarrhea, ear discharge, headaches, neck pain, rash, rhinorrhea or vomiting. She has tried ear drops for the symptoms. The treatment provided no relief.     Constitution: Negative for appetite change, chills, sweating, fatigue and fever.   HENT:  Positive for ear pain, hearing loss and sore throat. Negative for ear discharge, tongue pain, tongue lesion, facial swelling, congestion, postnasal drip, sinus pain, sinus pressure, trouble swallowing and voice change.    Neck: Positive for painful lymph nodes. Negative for neck pain and neck stiffness.   Cardiovascular:  Negative for chest pain and sob on exertion.   Eyes:  Negative for eye discharge and eye itching.   Respiratory:  Negative for cough.    Gastrointestinal:  Negative for abdominal pain, nausea, vomiting, constipation and diarrhea.   Musculoskeletal:  Negative for muscle cramps and muscle ache.   Skin:  Negative for color change, pale and rash.   Neurological:  Negative for dizziness, headaches, disorientation and altered mental status.   Hematologic/Lymphatic: Positive for swollen lymph nodes. Negative for easy bruising/bleeding. Does not bruise/bleed easily.   Psychiatric/Behavioral:  Negative for altered mental status, disorientation and confusion.    History reviewed. No pertinent past medical  history.    History reviewed. No pertinent surgical history.    History reviewed. No pertinent family history.    Social History     Socioeconomic History    Marital status:    Tobacco Use    Smoking status: Never    Smokeless tobacco: Never       Current Outpatient Medications   Medication Sig Dispense Refill    amoxicillin (AMOXIL) 500 MG Tab Take 1 tablet (500 mg total) by mouth every 12 (twelve) hours. for 10 days 20 tablet 0    ciprofloxacin-dexAMETHasone 0.3-0.1% (CIPRODEX) 0.3-0.1 % DrpS Place 4 drops into the left ear 2 (two) times daily. for 7 days 7.5 mL 0     No current facility-administered medications for this visit.       Review of patient's allergies indicates:   Allergen Reactions    Sulfa (sulfonamide antibiotics)          Objective:      Physical Exam   Constitutional: She is oriented to person, place, and time. She appears well-developed. She is cooperative.  Non-toxic appearance. She does not appear ill. No distress.   HENT:   Head: Normocephalic and atraumatic.   Ears:   Right Ear: Hearing, tympanic membrane, external ear and ear canal normal. impacted cerumen  Left Ear: Hearing, external ear and ear canal normal. Tympanic membrane is erythematous and bulging. Tympanic membrane mobility is abnormal. A middle ear effusion is present. impacted cerumen  Nose: Nose normal. No mucosal edema, rhinorrhea, nasal deformity or congestion. No epistaxis. Right sinus exhibits no maxillary sinus tenderness and no frontal sinus tenderness. Left sinus exhibits no maxillary sinus tenderness and no frontal sinus tenderness.   Mouth/Throat: Uvula is midline, oropharynx is clear and moist and mucous membranes are normal. No trismus in the jaw. Normal dentition. No uvula swelling. No oropharyngeal exudate, posterior oropharyngeal edema or posterior oropharyngeal erythema.   Left external ear canal erythematous no discharge noted      Comments: Left external ear canal erythematous no discharge noted  Eyes:  Conjunctivae and lids are normal. Right eye exhibits no discharge. Left eye exhibits no discharge. No scleral icterus.   Neck: Trachea normal and phonation normal. Neck supple. No edema present. No erythema present. No neck rigidity present.   Cardiovascular: Normal rate, regular rhythm, normal heart sounds and normal pulses.   No murmur heard.Exam reveals no gallop.   Pulmonary/Chest: Effort normal and breath sounds normal. No respiratory distress. She has no decreased breath sounds. She has no rhonchi.   Abdominal: Normal appearance.   Neurological: She is alert and oriented to person, place, and time. She exhibits normal muscle tone. Coordination normal.   Skin: Skin is warm, dry, intact, not diaphoretic, not pale and no rash.   Psychiatric: Her speech is normal and behavior is normal. Judgment and thought content normal.   Nursing note and vitals reviewed.      Assessment:       1. Other infective otitis externa, left ear    2. Acute serous otitis media of left ear, recurrence not specified          Plan:         Other infective otitis externa, left ear  -     ciprofloxacin-dexAMETHasone 0.3-0.1% (CIPRODEX) 0.3-0.1 % DrpS; Place 4 drops into the left ear 2 (two) times daily. for 7 days  Dispense: 7.5 mL; Refill: 0    Acute serous otitis media of left ear, recurrence not specified  -     amoxicillin (AMOXIL) 500 MG Tab; Take 1 tablet (500 mg total) by mouth every 12 (twelve) hours. for 10 days  Dispense: 20 tablet; Refill: 0       Patient Instructions   Use ear drops in left ear for one week. Take the antibiotics as prescribed with food. Avoid using qtips. Use tylenol/ibuprofen as needed for pain relief/fevers. If your symptoms should worsen please return to the urgent care or ED as needed.

## 2023-04-20 ENCOUNTER — HOSPITAL ENCOUNTER (EMERGENCY)
Facility: HOSPITAL | Age: 28
Discharge: HOME | End: 2023-04-20
Attending: EMERGENCY MEDICINE
Payer: COMMERCIAL

## 2023-04-20 VITALS
RESPIRATION RATE: 20 BRPM | SYSTOLIC BLOOD PRESSURE: 112 MMHG | HEART RATE: 84 BPM | OXYGEN SATURATION: 98 % | TEMPERATURE: 98 F | DIASTOLIC BLOOD PRESSURE: 72 MMHG

## 2023-04-20 DIAGNOSIS — R07.89 CHEST WALL PAIN: Primary | ICD-10-CM

## 2023-04-20 DIAGNOSIS — R07.9 CHEST PAIN: ICD-10-CM

## 2023-04-20 LAB
ALBUMIN SERPL BCP-MCNC: 4.2 G/DL (ref 3.5–5.2)
ALP SERPL-CCNC: 68 U/L (ref 55–135)
ALT SERPL W/O P-5'-P-CCNC: 12 U/L (ref 10–44)
ANION GAP SERPL CALC-SCNC: 10 MMOL/L (ref 8–16)
AST SERPL-CCNC: 14 U/L (ref 10–40)
B-HCG UR QL: NEGATIVE
BASOPHILS # BLD AUTO: 0.05 K/UL (ref 0–0.2)
BASOPHILS NFR BLD: 0.6 % (ref 0–1.9)
BILIRUB SERPL-MCNC: 0.3 MG/DL (ref 0.1–1)
BUN SERPL-MCNC: 12 MG/DL (ref 6–20)
CALCIUM SERPL-MCNC: 9.2 MG/DL (ref 8.7–10.5)
CHLORIDE SERPL-SCNC: 106 MMOL/L (ref 95–110)
CO2 SERPL-SCNC: 25 MMOL/L (ref 23–29)
CREAT SERPL-MCNC: 0.8 MG/DL (ref 0.5–1.4)
CTP QC/QA: YES
DIFFERENTIAL METHOD: ABNORMAL
EOSINOPHIL # BLD AUTO: 0.1 K/UL (ref 0–0.5)
EOSINOPHIL NFR BLD: 1.4 % (ref 0–8)
ERYTHROCYTE [DISTWIDTH] IN BLOOD BY AUTOMATED COUNT: 12 % (ref 11.5–14.5)
EST. GFR  (NO RACE VARIABLE): >60 ML/MIN/1.73 M^2
GLUCOSE SERPL-MCNC: 94 MG/DL (ref 70–110)
HCT VFR BLD AUTO: 39 % (ref 37–48.5)
HGB BLD-MCNC: 13.4 G/DL (ref 12–16)
IMM GRANULOCYTES # BLD AUTO: 0.02 K/UL (ref 0–0.04)
IMM GRANULOCYTES NFR BLD AUTO: 0.2 % (ref 0–0.5)
LIPASE SERPL-CCNC: 28 U/L (ref 4–60)
LYMPHOCYTES # BLD AUTO: 1.8 K/UL (ref 1–4.8)
LYMPHOCYTES NFR BLD: 21.2 % (ref 18–48)
MCH RBC QN AUTO: 32.2 PG (ref 27–31)
MCHC RBC AUTO-ENTMCNC: 34.4 G/DL (ref 32–36)
MCV RBC AUTO: 94 FL (ref 82–98)
MONOCYTES # BLD AUTO: 0.5 K/UL (ref 0.3–1)
MONOCYTES NFR BLD: 6.3 % (ref 4–15)
NEUTROPHILS # BLD AUTO: 6 K/UL (ref 1.8–7.7)
NEUTROPHILS NFR BLD: 70.3 % (ref 38–73)
NRBC BLD-RTO: 0 /100 WBC
PLATELET # BLD AUTO: 301 K/UL (ref 150–450)
PMV BLD AUTO: 10 FL (ref 9.2–12.9)
POTASSIUM SERPL-SCNC: 4.1 MMOL/L (ref 3.5–5.1)
PROT SERPL-MCNC: 7.5 G/DL (ref 6–8.4)
RBC # BLD AUTO: 4.16 M/UL (ref 4–5.4)
SODIUM SERPL-SCNC: 141 MMOL/L (ref 136–145)
TROPONIN I SERPL DL<=0.01 NG/ML-MCNC: <0.006 NG/ML (ref 0–0.03)
WBC # BLD AUTO: 8.57 K/UL (ref 3.9–12.7)

## 2023-04-20 PROCEDURE — 96374 THER/PROPH/DIAG INJ IV PUSH: CPT

## 2023-04-20 PROCEDURE — 63600175 PHARM REV CODE 636 W HCPCS: Performed by: PHYSICIAN ASSISTANT

## 2023-04-20 PROCEDURE — 84484 ASSAY OF TROPONIN QUANT: CPT | Performed by: PHYSICIAN ASSISTANT

## 2023-04-20 PROCEDURE — 83690 ASSAY OF LIPASE: CPT | Performed by: PHYSICIAN ASSISTANT

## 2023-04-20 PROCEDURE — 99285 EMERGENCY DEPT VISIT HI MDM: CPT | Mod: 25

## 2023-04-20 PROCEDURE — 93005 ELECTROCARDIOGRAM TRACING: CPT

## 2023-04-20 PROCEDURE — 81025 URINE PREGNANCY TEST: CPT | Performed by: PHYSICIAN ASSISTANT

## 2023-04-20 PROCEDURE — 93010 ELECTROCARDIOGRAM REPORT: CPT | Mod: ,,, | Performed by: INTERNAL MEDICINE

## 2023-04-20 PROCEDURE — 80053 COMPREHEN METABOLIC PANEL: CPT | Performed by: PHYSICIAN ASSISTANT

## 2023-04-20 PROCEDURE — 93010 EKG 12-LEAD: ICD-10-PCS | Mod: ,,, | Performed by: INTERNAL MEDICINE

## 2023-04-20 PROCEDURE — 85025 COMPLETE CBC W/AUTO DIFF WBC: CPT | Performed by: PHYSICIAN ASSISTANT

## 2023-04-20 RX ORDER — KETOROLAC TROMETHAMINE 30 MG/ML
15 INJECTION, SOLUTION INTRAMUSCULAR; INTRAVENOUS
Status: COMPLETED | OUTPATIENT
Start: 2023-04-20 | End: 2023-04-20

## 2023-04-20 RX ORDER — NAPROXEN 500 MG/1
500 TABLET ORAL 2 TIMES DAILY WITH MEALS
Qty: 30 TABLET | Refills: 0 | Status: SHIPPED | OUTPATIENT
Start: 2023-04-20 | End: 2023-10-05

## 2023-04-20 RX ADMIN — KETOROLAC TROMETHAMINE 15 MG: 30 INJECTION, SOLUTION INTRAMUSCULAR; INTRAVENOUS at 03:04

## 2023-04-20 NOTE — ED PROVIDER NOTES
"Encounter Date: 4/20/2023       History     Chief Complaint   Patient presents with    Chest Pain     Chest pain radiating to (R) arm since yesterday. Denies cough, fever or uri symptoms. Pain is improved by splinting (R) arm. Currently 5/10.      27-year-old female presents to ED with concern of right-sided chest and right shoulder pain that began yesterday while doing dishes.  Denies any specific injury or trauma.  Pain described as sharp, worse with movements or positions, mildly improved with rest, current severity 5/10.  Denies history of similar symptoms.  She does report significant abdominal history with multiple abdominal surgeries, but denying any significant abdominal pain at this time.  No nausea, vomiting, cough, shortness of breath, palpitations, lightheadedness or dizziness, leg swelling.  No recent long distance travel or surgery, use of hormone medications, history of DVT/PE.  She did take Motrin this morning with minimal improvement.  She does admit to significant family history of "heart problems". No other acute complaints at this time.    The history is provided by the patient.   Review of patient's allergies indicates:   Allergen Reactions    Sulfa (sulfonamide antibiotics)      No past medical history on file.  No past surgical history on file.  No family history on file.  Social History     Tobacco Use    Smoking status: Never    Smokeless tobacco: Never     Review of Systems   Constitutional:  Negative for chills and fever.   Respiratory:  Negative for cough and shortness of breath.    Cardiovascular:  Positive for chest pain. Negative for palpitations and leg swelling.   Gastrointestinal:  Negative for abdominal pain, diarrhea, nausea and vomiting.   Musculoskeletal:  Negative for back pain.   Neurological:  Negative for dizziness and light-headedness.     Physical Exam     Initial Vitals [04/20/23 1258]   BP Pulse Resp Temp SpO2   112/72 84 20 97.8 °F (36.6 °C) 98 %      MAP       --     "     Physical Exam    Nursing note and vitals reviewed.  Constitutional: She appears well-developed and well-nourished. She is cooperative. She does not have a sickly appearance. She does not appear ill. No distress.   HENT:   Head: Normocephalic and atraumatic.   Eyes: EOM are normal.   Neck: Neck supple.   Normal range of motion.  Cardiovascular:  Normal rate, regular rhythm and normal heart sounds.           Pulmonary/Chest: Effort normal and breath sounds normal.   Reproducible chest tenderness towards right upper chest wall/clavicular region.  No overlying skin changes, rashes or visible bruising.  Tenderness does extend towards right lateral and posterior shoulder.  Pain appears to worsened with movement of right upper extremity against resistance.  Lungs are clear bilaterally.   Abdominal: Abdomen is soft.   Musculoskeletal:         General: No tenderness.      Cervical back: Normal range of motion and neck supple.     Neurological: She is alert and oriented to person, place, and time. She is not disoriented. GCS eye subscore is 4. GCS verbal subscore is 5. GCS motor subscore is 6.   Skin: Skin is warm and dry.   Psychiatric: She has a normal mood and affect. Her speech is normal and behavior is normal. Thought content normal.       ED Course   Procedures  Labs Reviewed   CBC W/ AUTO DIFFERENTIAL - Abnormal; Notable for the following components:       Result Value    MCH 32.2 (*)     All other components within normal limits   COMPREHENSIVE METABOLIC PANEL   LIPASE   TROPONIN I   POCT URINE PREGNANCY          Imaging Results              X-Ray Chest 1 View (Final result)  Result time 04/20/23 14:26:07      Final result by Juan Francisco Armas MD (04/20/23 14:26:07)                   Impression:      See above      Electronically signed by: Juan Francisco Armas MD  Date:    04/20/2023  Time:    14:26               Narrative:    EXAMINATION:  XR CHEST 1 VIEW    CLINICAL HISTORY:  Chest pain,  unspecified    TECHNIQUE:  Single frontal view of the chest was performed.    COMPARISON:  None    FINDINGS:  Cardiac size is normal.  Lungs are clear.  No infiltrate is seen.                                       Medications   ketorolac injection 15 mg (15 mg Intravenous Given 4/20/23 1513)     Medical Decision Making:   Initial Assessment:   Patient presents with concern of right-sided shoulder/chest pain that began yesterday while doing dishes.  Denying any specific injury trauma.  Denying respiratory complaints, leg swelling, palpitations, lightheadedness or dizziness.  Afebrile with vitals WNL.  Reproducible chest wall tenderness towards right upper chest/clavicular region, extending throughout right shoulder.  Pain appears worse with movement of right upper extremity against resistance.  Lungs are clear.  Differential Diagnosis:   Musculoskeletal, costochondritis, pleurisy, stress, anxiety, MANUEL, STEMI, NSTEMI, PE  ED Management:  Perc score 0; I do not suspect PE.  EKG NSR, rate 81, no acute ST changes or STEMI.  EKG reviewed with attending, Dr. Perez  CXR with no acute cardiopulmonary findings.  CBC unremarkable no elevation in WBC.  CMP WNL with no electrolyte abnormalities.  Normal renal and liver function.  Lipase WNL.  Troponin WNL.  UPT negative.    With careful consideration, do have lower concern for acute cardiopulmonary event requiring emergent intervention or hospitalization.  Symptoms do seem more musculoskeletal as pain is worse with certain positions and movement but alleviated with rest.  Will plan to continue with conservative care.  Prescription for naproxen.  Encouraged ice, activities and movements as tolerated, close PCP follow-up.  ED return precautions were discussed at length.  Patient states her understanding and agrees with plan.                        Clinical Impression:   Final diagnoses:  [R07.9] Chest pain  [R07.89] Chest wall pain (Primary)        ED Disposition Condition     Discharge Stable          ED Prescriptions       Medication Sig Dispense Start Date End Date Auth. Provider    naproxen (NAPROSYN) 500 MG tablet Take 1 tablet (500 mg total) by mouth 2 (two) times daily with meals. 30 tablet 4/20/2023 -- John Morgan PA-C          Follow-up Information       Follow up With Specialties Details Why Contact Info    Your Doctor                 John Morgan PA-C  04/20/23 2018

## 2023-04-20 NOTE — ED TRIAGE NOTES
Review of patient's allergies indicates:   Allergen Reactions    Sulfa (sulfonamide antibiotics)      Chief Complaint   Patient presents with    Chest Pain     Chest pain radiating to (R) arm since yesterday. Denies cough, fever or uri symptoms. Pain is improved by splinting (R) arm. Currently 5/10.      No past medical history on file.    Patient identifiers verified and correct.     LOC: The patient is awake, alert and aware of environment with an appropriate affect, the patient is oriented x 3 and speaking appropriately.     APPEARANCE: Patient appears comfortable and in no acute distress, patient is clean and well groomed.    HEENT: Head symmetrical. Eyes bilateral.  Bilateral ears without drainage. Bilateral nares patent, throat clear.    SKIN: The skin is warm and dry, color consistent with ethnicity, patient has normal skin turgor and moist mucus membranes, skin intact, no breakdown or bruising noted.     MUSCULOSKELETAL: Patient moving all extremities spontaneously, no swelling noted.    RESPIRATORY: Airway is open and patent, respirations are spontaneous, patient has a normal effort and rate, no accessory muscle use noted.     CARDIAC: Patient has a normal rate and regular rhythm, no edema noted, capillary refill < 3 seconds.     GASTRO: Abdomen soft and non-distended.     NEURO: Pt opens eyes spontaneously pupils equal, round, and reactive. behavior appropriate to situation, follows commands, facial expression symmetrical,      NEUROVASCULAR: All extremities are warm and pink.     Will continue to monitor.

## 2023-04-20 NOTE — DISCHARGE INSTRUCTIONS

## 2023-04-20 NOTE — Clinical Note
"Summer "Summer" Prosper was seen and treated in our emergency department on 4/20/2023.  She may return to work on 04/21/2023.       If you have any questions or concerns, please don't hesitate to call.      John Morgan PA-C"

## 2023-05-03 ENCOUNTER — OFFICE VISIT (OUTPATIENT)
Dept: GASTROENTEROLOGY | Facility: CLINIC | Age: 28
End: 2023-05-03
Payer: COMMERCIAL

## 2023-05-03 VITALS — WEIGHT: 166.69 LBS

## 2023-05-03 DIAGNOSIS — R19.7 DIARRHEA, UNSPECIFIED TYPE: Primary | ICD-10-CM

## 2023-05-03 DIAGNOSIS — K21.9 GASTROESOPHAGEAL REFLUX DISEASE, UNSPECIFIED WHETHER ESOPHAGITIS PRESENT: ICD-10-CM

## 2023-05-03 PROCEDURE — 99205 PR OFFICE/OUTPT VISIT, NEW, LEVL V, 60-74 MIN: ICD-10-PCS | Mod: S$GLB,,,

## 2023-05-03 PROCEDURE — 99205 OFFICE O/P NEW HI 60 MIN: CPT | Mod: S$GLB,,,

## 2023-05-03 PROCEDURE — 1159F MED LIST DOCD IN RCRD: CPT | Mod: CPTII,S$GLB,,

## 2023-05-03 PROCEDURE — 99999 PR PBB SHADOW E&M-EST. PATIENT-LVL III: ICD-10-PCS | Mod: PBBFAC,,,

## 2023-05-03 PROCEDURE — 99999 PR PBB SHADOW E&M-EST. PATIENT-LVL III: CPT | Mod: PBBFAC,,,

## 2023-05-03 PROCEDURE — 1159F PR MEDICATION LIST DOCUMENTED IN MEDICAL RECORD: ICD-10-PCS | Mod: CPTII,S$GLB,,

## 2023-05-03 RX ORDER — CHOLESTYRAMINE 4 G/9G
4 POWDER, FOR SUSPENSION ORAL 2 TIMES DAILY
Qty: 180 PACKET | Refills: 3 | Status: SHIPPED | OUTPATIENT
Start: 2023-05-03 | End: 2023-10-05

## 2023-05-03 RX ORDER — SODIUM, POTASSIUM,MAG SULFATES 17.5-3.13G
1 SOLUTION, RECONSTITUTED, ORAL ORAL DAILY
Qty: 1 EACH | Refills: 0 | Status: SHIPPED | OUTPATIENT
Start: 2023-05-03 | End: 2023-06-08 | Stop reason: SDUPTHER

## 2023-05-03 NOTE — PATIENT INSTRUCTIONS
Stool Collection Kit Instructions    Place stool Collection Hat under your toilet seat   With and disposable spoon scoop and fill the provided cup with stool to the half way gil on the stool cup (if your stool is runny or liquid that is fine)  Place the top on the stool cup  Place the stool cup in the biohazard bag and zip the bag closed   Place the biohazard bag in the brown paper bag that is provided   Bring the stool sample to any [facility] lab (Any location where you can get blood work done)  Discard any extra equipment (throw away the stool hat, spoon WE DO NOT NEED THAT BACK)        If the sample is collected after clinic hours, please place in refrigerator or cooler until the next morning.  Please make sure that there is no Tissue, wipes, or other paper attached to the stool cup (the lab will have you repeat if something is attached to the cup).            SUPREP Instructions    [facility] [address]  Clinic Office [phone]  Endoscopy Lab [phone]    You are scheduled for a Colonoscopy with       on July at [facility] in [place].    Check in at the Hospital -1st floor, Information desk.   Call [phone] to reschedule.    An adult friend/family member must come with you to drive you home.  You cannot drive, take a taxi, Uber/Lyft or bus to leave the Endoscopy Center alone.  If you do not have someone to drive you home, your test will be cancelled.     Please follow the directions of your doctor if you take any pills that thin your blood. If you take these meds: Aggrenox, Brilinta, Effient, Eliquis, Lovenox, Plavix, Pletal, Pradaxa, Ticilid, Xarelto or Coumadin, let the doctor's office know.    DON'T: On the morning of the test do not take insulin or pills for diabetes.     DO: On the morning of the test, do take any pills for blood pressure, heart, anti-rejection and or seizures with a small sip of water. Bring any inhalers with you.    To have a good prep, you  must follow these instructions - please do not use the directions from the pharmacy.    The doctor will send a prescription for the SUPREP.      The Day Before the test:    You can only drink CLEAR LIQUIDS the whole day before your test.  You can't eat any food for the whole day.    You CAN have:  Water, Coffee or decaf coffee (no milk or cream)  Tea  Soft drinks - regular and sugar free  Jello (green or yellow)  Apple Juice, white grape juice, white cranberry juice  Gatorade, Power Aid, Crystal Light, Shine Aid  Lemonade and Limeade  Bouillon, clear soup  Snowball, popsicles  YOU CAN'T DRINK ANYTHING RED, PURPLE ORANGE OR BLUE   YOU CAN'T DRINK ALCOHOL  ONLY DRINK WHAT IS ON THE LIST      At 5 pm the night before your test:    Pour the 1st bottle of SUPREP into the cup provided in the box. Add water to the line on the cup and mix well.  Drink the whole cup and then drink 2 more full cups of water over 1 hour.  This can be easier to drink if it is cold. You can mix it 20 minutes ahead of time and place in the refrigerator before you drink it.  You must drink it within 30-45 minutes of mixing it.  Do NOT pour the drink over ice.  You can drink it with a straw.    The Day of the test - We will call you 2 days before your test to tell you what time to get there.    5 hours before you come to the hospital (this may be in the middle of the night)  Pour the 2nd bottle of SUPREP into the cup provided in the box. Add water to the line on the cup and mix well.  Drink the whole cup and then drink 2 more full cups of water over 1 hour.  It might be easier to drink if it is cold. You can mix it 20 minutes ahead of time and place in the refrigerator before you drink it.  You must drink it within 30-45 minutes of mixing it.  Do NOT pour the drink over ice.  You can drink it with a straw.    YOU CAN'T EAT OR DRINK ANYTHING ELSE ONCE YOU FINISH THE PREP    Leave all valuables and jewelry at home. You will be at the hospital for 2-4  hours.    Call the Endoscopy department at [phone] with any questions about your procedure.

## 2023-05-03 NOTE — PROGRESS NOTES
"     GASTROENTEROLOGY CLINIC NOTE    Reason for visit: The primary encounter diagnosis was Diarrhea, unspecified type. A diagnosis of Gastroesophageal reflux disease, unspecified whether esophagitis present was also pertinent to this visit.  Referring provider/PCP: Primary Doctor No    HPI:  Aspen Cheng is a 27 y.o. female here today for f/u. She has had extensive workup with Dr. Rodriguez at [facility], including multiple EGD/colonoscopies, stretta procedure for reflux, and linx procedure. She denies any further issues with reflux but has been noticing chest pain recently. She presented to the ER, cardiac origin was ruled out. She denies any N/V, no pyrosis, water brash or acid regurgitation. Chest pain is constant, usually worsens with eating. She also has diarrhea. Ongoing for many years, worsened since gallbladder removal in 2011. She has tried immodium, lomotil, and pepto without relief. Has up to 5-6 BM's/day, "pudding" like and watery in consistency, no nocturnal BM's. She recently noticed large amounts of blood x2 episodes in the last few weeks. Hgb stable on 4/20. She is unsure of all tests in previous workup, no definitive diagnosis except hiatal hernia.  No known FH of CRC or IBD.     Prior Endoscopy: multiple at outside facility, will request records  EGD:  Colon:    (Portions of this note were dictated using voice recognition software and may contain dictation related errors in spelling/grammar/syntax not found on text review)    Review of Systems   Gastrointestinal:  Positive for blood in stool and diarrhea. Negative for abdominal pain, heartburn, nausea and vomiting.     Past Medical History: has no past medical history on file.    Past Surgical History: has no past surgical history on file.    Home medications:   Current Outpatient Medications on File Prior to Visit   Medication Sig Dispense Refill    naproxen (NAPROSYN) 500 MG tablet Take 1 tablet (500 mg total) by mouth 2 (two) times daily with meals. " (Patient not taking: Reported on 5/3/2023) 30 tablet 0     No current facility-administered medications on file prior to visit.       Vital signs:  Wt 75.6 kg (166 lb 10.7 oz)     Physical Exam  Constitutional:       Appearance: Normal appearance.   Abdominal:      General: Abdomen is flat. There is no distension.      Palpations: Abdomen is soft.      Tenderness: There is no abdominal tenderness.   Neurological:      Mental Status: She is alert.       I spent greater than 60 minutes combination of : a focused physical exam and history, reviewing outside records, reviewing of any available radiographs, counseling, communicating with other health care professionals regarding this patient's medical condition, independently interpreting results, and documenting clinical information in the medical record.     I have reviewed associated labs, imaging and notes.     Assessment:  1. Diarrhea, unspecified type    2. Gastroesophageal reflux disease, unspecified whether esophagitis present    Extensive workup completed by Dr. Rodriguez throughout the years   Multiple EGD/colonoscopies completed- unaware of results, possible inflammation? Hiatal hernia noted   Underwent stretta in 2018, linx in 2019   No actual acid reflux or pyrosis but has been having chest pain    Presented to ER- cardiac origin r/o   Diarrhea, ongoing for many years    No cause found previously    Worsened since cholecystectomy in 2011    No nocturnal BM's, anywhere from 6-7 BM's day    No abd pain, noticed BRBPR twice in the last few weeks    No relief with imodium, pepto, or lomotil    Plan:  Orders Placed This Encounter    Stool culture    Calprotectin, Stool    Pancreatic elastase, fecal    Stool Exam-Ova,Cysts,Parasites    WBC, Stool    cholestyramine (QUESTRAN) 4 gram packet    sodium,potassium,mag sulfates (SUPREP BOWEL PREP KIT) 17.5-3.13-1.6 gram SolR    Case Request Endoscopy: COLONOSCOPY, EGD (ESOPHAGOGASTRODUODENOSCOPY)     Start questran  daily  Collect stool studies    Schedule EGD/colonoscopy   EGD for chest pain after eating   Colonoscopy for diarrhea, rectal bleeding    Obtain records from outside GI    Lindsey Ray, NP [facility]

## 2023-06-02 ENCOUNTER — LAB VISIT (OUTPATIENT)
Dept: LAB | Facility: HOSPITAL | Age: 28
End: 2023-06-02
Payer: COMMERCIAL

## 2023-06-02 DIAGNOSIS — R19.7 DIARRHEA, UNSPECIFIED TYPE: ICD-10-CM

## 2023-06-02 LAB — WBC #/AREA STL HPF: ABNORMAL /[HPF]

## 2023-06-02 PROCEDURE — 87046 STOOL CULTR AEROBIC BACT EA: CPT

## 2023-06-02 PROCEDURE — 87449 NOS EACH ORGANISM AG IA: CPT

## 2023-06-02 PROCEDURE — 87209 SMEAR COMPLEX STAIN: CPT

## 2023-06-02 PROCEDURE — 87427 SHIGA-LIKE TOXIN AG IA: CPT | Mod: 59

## 2023-06-02 PROCEDURE — 89055 LEUKOCYTE ASSESSMENT FECAL: CPT

## 2023-06-02 PROCEDURE — 82653 EL-1 FECAL QUANTITATIVE: CPT

## 2023-06-02 PROCEDURE — 83993 ASSAY FOR CALPROTECTIN FECAL: CPT

## 2023-06-02 PROCEDURE — 87045 FECES CULTURE AEROBIC BACT: CPT

## 2023-06-05 LAB
BACTERIA STL CULT: NORMAL
E COLI SXT1 STL QL IA: NEGATIVE
E COLI SXT2 STL QL IA: NEGATIVE

## 2023-06-06 LAB — ELASTASE 1, FECAL: 173 MCG/G

## 2023-06-07 LAB — CALPROTECTIN STL-MCNT: <27.1 MCG/G

## 2023-06-08 DIAGNOSIS — R19.7 DIARRHEA, UNSPECIFIED TYPE: Primary | ICD-10-CM

## 2023-06-08 DIAGNOSIS — K21.9 GASTROESOPHAGEAL REFLUX DISEASE, UNSPECIFIED WHETHER ESOPHAGITIS PRESENT: ICD-10-CM

## 2023-06-08 RX ORDER — SODIUM, POTASSIUM,MAG SULFATES 17.5-3.13G
1 SOLUTION, RECONSTITUTED, ORAL ORAL DAILY
Qty: 1 EACH | Refills: 0 | Status: ON HOLD | OUTPATIENT
Start: 2023-06-08 | End: 2023-07-19 | Stop reason: CLARIF

## 2023-06-09 ENCOUNTER — TELEPHONE (OUTPATIENT)
Dept: GASTROENTEROLOGY | Facility: CLINIC | Age: 28
End: 2023-06-09
Payer: COMMERCIAL

## 2023-06-15 LAB — O+P STL MICRO: NORMAL

## 2023-07-17 ENCOUNTER — TELEPHONE (OUTPATIENT)
Dept: ENDOSCOPY | Facility: HOSPITAL | Age: 28
End: 2023-07-17
Payer: COMMERCIAL

## 2023-07-17 NOTE — TELEPHONE ENCOUNTER
Spoke with patient about arrival time @ 0915.   EGD/Colon    Prep instructions reviewed: the day before the procedure, follow a clear liquid diet all day, then start the first 1/2 of prep at 5pm and take 2nd 1/2 of prep @ 0400.  Pt must be completely NPO when prep completed @ 0615.              Medications: Do not take Insulin or oral diabetic medications the day of the procedure.  Take as prescribed: heart, seizure and blood pressure medication in the morning with a sip of water (less than an ounce).  Take any breathing medications and bring inhalers to hospital with you Leave all valuables and jewelry at home.     Wear comfortable clothes to procedure to change into hospital gown You cannot drive for 24 hours after your procedure because you will receive sedation for your procedure to make you comfortable.  A ride must be provided at discharge.

## 2023-07-19 ENCOUNTER — ANESTHESIA EVENT (OUTPATIENT)
Dept: ENDOSCOPY | Facility: HOSPITAL | Age: 28
End: 2023-07-19
Payer: COMMERCIAL

## 2023-07-19 ENCOUNTER — ANESTHESIA (OUTPATIENT)
Dept: ENDOSCOPY | Facility: HOSPITAL | Age: 28
End: 2023-07-19
Payer: COMMERCIAL

## 2023-07-19 ENCOUNTER — HOSPITAL ENCOUNTER (OUTPATIENT)
Facility: HOSPITAL | Age: 28
Discharge: HOME | End: 2023-07-19
Attending: INTERNAL MEDICINE | Admitting: INTERNAL MEDICINE
Payer: COMMERCIAL

## 2023-07-19 VITALS
DIASTOLIC BLOOD PRESSURE: 69 MMHG | BODY MASS INDEX: 28 KG/M2 | WEIGHT: 164 LBS | TEMPERATURE: 97 F | RESPIRATION RATE: 18 BRPM | HEIGHT: 64 IN | SYSTOLIC BLOOD PRESSURE: 106 MMHG | HEART RATE: 62 BPM | OXYGEN SATURATION: 99 %

## 2023-07-19 DIAGNOSIS — R19.7 DIARRHEA: ICD-10-CM

## 2023-07-19 LAB
B-HCG UR QL: NEGATIVE
CTP QC/QA: YES

## 2023-07-19 PROCEDURE — 45380 PR COLONOSCOPY,BIOPSY: ICD-10-PCS | Mod: 59,,, | Performed by: INTERNAL MEDICINE

## 2023-07-19 PROCEDURE — 45385 COLONOSCOPY W/LESION REMOVAL: CPT | Performed by: INTERNAL MEDICINE

## 2023-07-19 PROCEDURE — 37000009 HC ANESTHESIA EA ADD 15 MINS: Performed by: INTERNAL MEDICINE

## 2023-07-19 PROCEDURE — D9220A PRA ANESTHESIA: ICD-10-PCS | Mod: CRNA,,, | Performed by: NURSE ANESTHETIST, CERTIFIED REGISTERED

## 2023-07-19 PROCEDURE — 88305 TISSUE EXAM BY PATHOLOGIST: ICD-10-PCS | Mod: 26,,, | Performed by: STUDENT IN AN ORGANIZED HEALTH CARE EDUCATION/TRAINING PROGRAM

## 2023-07-19 PROCEDURE — 25000003 PHARM REV CODE 250: Performed by: NURSE ANESTHETIST, CERTIFIED REGISTERED

## 2023-07-19 PROCEDURE — 88342 CHG IMMUNOCYTOCHEMISTRY: ICD-10-PCS | Mod: 26,,, | Performed by: STUDENT IN AN ORGANIZED HEALTH CARE EDUCATION/TRAINING PROGRAM

## 2023-07-19 PROCEDURE — 45385 COLONOSCOPY W/LESION REMOVAL: CPT | Mod: ,,, | Performed by: INTERNAL MEDICINE

## 2023-07-19 PROCEDURE — D9220A PRA ANESTHESIA: Mod: CRNA,,, | Performed by: NURSE ANESTHETIST, CERTIFIED REGISTERED

## 2023-07-19 PROCEDURE — D9220A PRA ANESTHESIA: Mod: ANES,,, | Performed by: STUDENT IN AN ORGANIZED HEALTH CARE EDUCATION/TRAINING PROGRAM

## 2023-07-19 PROCEDURE — 43239 PR EGD, FLEX, W/BIOPSY, SGL/MULTI: ICD-10-PCS | Mod: ,,, | Performed by: INTERNAL MEDICINE

## 2023-07-19 PROCEDURE — 88305 TISSUE EXAM BY PATHOLOGIST: CPT | Mod: 26,,, | Performed by: STUDENT IN AN ORGANIZED HEALTH CARE EDUCATION/TRAINING PROGRAM

## 2023-07-19 PROCEDURE — 25000003 PHARM REV CODE 250: Performed by: INTERNAL MEDICINE

## 2023-07-19 PROCEDURE — 88342 IMHCHEM/IMCYTCHM 1ST ANTB: CPT | Mod: 26,,, | Performed by: STUDENT IN AN ORGANIZED HEALTH CARE EDUCATION/TRAINING PROGRAM

## 2023-07-19 PROCEDURE — 88342 IMHCHEM/IMCYTCHM 1ST ANTB: CPT | Performed by: STUDENT IN AN ORGANIZED HEALTH CARE EDUCATION/TRAINING PROGRAM

## 2023-07-19 PROCEDURE — 43239 EGD BIOPSY SINGLE/MULTIPLE: CPT | Performed by: INTERNAL MEDICINE

## 2023-07-19 PROCEDURE — 37000008 HC ANESTHESIA 1ST 15 MINUTES: Performed by: INTERNAL MEDICINE

## 2023-07-19 PROCEDURE — 63600175 PHARM REV CODE 636 W HCPCS: Performed by: NURSE ANESTHETIST, CERTIFIED REGISTERED

## 2023-07-19 PROCEDURE — 81025 URINE PREGNANCY TEST: CPT | Performed by: INTERNAL MEDICINE

## 2023-07-19 PROCEDURE — 45385 PR COLONOSCOPY,REMV LESN,SNARE: ICD-10-PCS | Mod: ,,, | Performed by: INTERNAL MEDICINE

## 2023-07-19 PROCEDURE — 27201012 HC FORCEPS, HOT/COLD, DISP: Performed by: INTERNAL MEDICINE

## 2023-07-19 PROCEDURE — 43239 EGD BIOPSY SINGLE/MULTIPLE: CPT | Mod: ,,, | Performed by: INTERNAL MEDICINE

## 2023-07-19 PROCEDURE — 45380 COLONOSCOPY AND BIOPSY: CPT | Mod: 59 | Performed by: INTERNAL MEDICINE

## 2023-07-19 PROCEDURE — 45380 COLONOSCOPY AND BIOPSY: CPT | Mod: 59,,, | Performed by: INTERNAL MEDICINE

## 2023-07-19 PROCEDURE — D9220A PRA ANESTHESIA: ICD-10-PCS | Mod: ANES,,, | Performed by: STUDENT IN AN ORGANIZED HEALTH CARE EDUCATION/TRAINING PROGRAM

## 2023-07-19 PROCEDURE — 88305 TISSUE EXAM BY PATHOLOGIST: CPT | Mod: 59 | Performed by: STUDENT IN AN ORGANIZED HEALTH CARE EDUCATION/TRAINING PROGRAM

## 2023-07-19 PROCEDURE — 27201089 HC SNARE, DISP (ANY): Performed by: INTERNAL MEDICINE

## 2023-07-19 RX ORDER — PROPOFOL 10 MG/ML
VIAL (ML) INTRAVENOUS CONTINUOUS PRN
Status: DISCONTINUED | OUTPATIENT
Start: 2023-07-19 | End: 2023-07-19

## 2023-07-19 RX ORDER — ONDANSETRON 2 MG/ML
INJECTION INTRAMUSCULAR; INTRAVENOUS
Status: DISCONTINUED | OUTPATIENT
Start: 2023-07-19 | End: 2023-07-19

## 2023-07-19 RX ORDER — PROPOFOL 10 MG/ML
VIAL (ML) INTRAVENOUS
Status: DISCONTINUED | OUTPATIENT
Start: 2023-07-19 | End: 2023-07-19

## 2023-07-19 RX ORDER — LIDOCAINE HYDROCHLORIDE 20 MG/ML
INJECTION INTRAVENOUS
Status: DISCONTINUED | OUTPATIENT
Start: 2023-07-19 | End: 2023-07-19

## 2023-07-19 RX ORDER — SODIUM CHLORIDE 9 MG/ML
INJECTION, SOLUTION INTRAVENOUS CONTINUOUS
Status: DISCONTINUED | OUTPATIENT
Start: 2023-07-19 | End: 2023-07-19 | Stop reason: HOSPADM

## 2023-07-19 RX ADMIN — SODIUM CHLORIDE: 9 INJECTION, SOLUTION INTRAVENOUS at 10:07

## 2023-07-19 RX ADMIN — PROPOFOL 100 MG: 10 INJECTION, EMULSION INTRAVENOUS at 10:07

## 2023-07-19 RX ADMIN — ONDANSETRON 8 MG: 2 INJECTION, SOLUTION INTRAMUSCULAR; INTRAVENOUS at 10:07

## 2023-07-19 RX ADMIN — GLYCOPYRROLATE 0.1 MG: 0.2 INJECTION, SOLUTION INTRAMUSCULAR; INTRAVITREAL at 10:07

## 2023-07-19 RX ADMIN — PROPOFOL 200 MCG/KG/MIN: 10 INJECTION, EMULSION INTRAVENOUS at 10:07

## 2023-07-19 RX ADMIN — LIDOCAINE HYDROCHLORIDE 100 MG: 20 INJECTION, SOLUTION INTRAVENOUS at 10:07

## 2023-07-19 NOTE — ANESTHESIA PREPROCEDURE EVALUATION
[facility]  Anesthesia Pre-Operative Evaluation         Patient Name: Aspen Cheng  YOB: 1995  MRN: [id number]    SUBJECTIVE:     07/19/2023    Procedure(s) (LRB):  COLONOSCOPY (N/A)  EGD (ESOPHAGOGASTRODUODENOSCOPY) (N/A)    Aspen Cheng is a 27 y.o. female here for Procedure(s) (LRB):  COLONOSCOPY (N/A)  EGD (ESOPHAGOGASTRODUODENOSCOPY) (N/A)    Drips:     Patient Active Problem List   Diagnosis    Dog bite of right hand       Review of patient's allergies indicates:   Allergen Reactions    Sulfa (sulfonamide antibiotics)        No current facility-administered medications on file prior to encounter.     Current Outpatient Medications on File Prior to Encounter   Medication Sig Dispense Refill    amitriptyline (ELAVIL) 25 MG tablet TK 1 T PO QHS  1    amoxicillin-clavulanate 875-125mg (AUGMENTIN) 875-125 mg per tablet Take 1 tablet by mouth 2 (two) times daily. (Patient not taking: Reported on 5/5/2020) 14 tablet 0    cholestyramine (QUESTRAN) 4 gram packet Take 1 packet (4 g total) by mouth 2 (two) times daily. 180 packet 3    colestipol (COLESTID) 1 gram Tab Take 1 g by mouth.      esomeprazole (NEXIUM) 20 MG capsule Take 20 mg by mouth.      etonogestrel-ethinyl estradiol (NUVARING) 0.12-0.015 mg/24 hr vaginal ring INSERT 1 RING VAGINALLY AND LEAVE IN PLACE FOR 3 WEEKS THEN REMOVE FOR 1 WEEK (Patient not taking: Reported on 3/16/2020) 1 each 12    HYDROcodone-acetaminophen (NORCO) 5-325 mg per tablet Take 1 tablet by mouth every 6 (six) hours as needed. (Patient not taking: Reported on 5/5/2020) 12 tablet 0    MOTOFEN 1-0.025 mg Tab TK 1 T PO  QID PRF DIARRHEA  5    naproxen (NAPROSYN) 500 MG tablet Take 1 tablet (500 mg total) by mouth 2 (two) times daily with meals. (Patient not taking: Reported on 5/3/2023) 30 tablet 0    pantoprazole (PROTONIX) 40 MG tablet TK 1 T PO BID 30  MINUTES  TO 1 HOUR B MEALS  2    triamcinolone acetonide 0.1% (KENALOG) 0.1 % cream Apply topically 2 (two) times daily. 45 g 1       Past Surgical History:   Procedure Laterality Date    APPENDECTOMY       SECTION      x2    CHOLECYSTECTOMY      HERNIA REPAIR      with LINX procedure    PELVIC LAPAROSCOPY         Social History     Socioeconomic History    Marital status:    Tobacco Use    Smoking status: Never    Smokeless tobacco: Never   Substance and Sexual Activity    Alcohol use: Yes     Comment: socialy    Drug use: No    Sexual activity: Yes     Partners: Male   Social History Narrative    ** Merged History Encounter **              OBJECTIVE:     Vital Signs Range (Last 24H):       Significant Labs:  Lab Results   Component Value Date    WBC 8.57 2023    HGB 13.4 2023    HCT 39.0 2023     2023    ALT 12 2023    AST 14 2023     2023    K 4.1 2023     2023    CREATININE 0.8 2023    BUN 12 2023    CO2 25 2023       Diagnostic Studies:    EKG:   Results for orders placed or performed during the hospital encounter of 23   EKG 12-lead    Collection Time: 23 12:56 PM    Narrative    Test Reason : R07.9,    Vent. Rate : 081 BPM     Atrial Rate : 081 BPM     P-R Int : 172 ms          QRS Dur : 086 ms      QT Int : 348 ms       P-R-T Axes : 060 052 062 degrees     QTc Int : 404 ms    Normal sinus rhythm  Normal ECG  No previous ECGs available  Confirmed by Rodrigo Parker MD ([id number]) on 2023 1:19:00 PM    Referred By: BRANDIN   SELF           Confirmed By:Rodrigo Parker MD       Pre-op Assessment    I have reviewed the Patient Summary Reports.     I have reviewed the Nursing Notes. I have reviewed the NPO Status.   I have reviewed the Medications.     Review of Systems  Anesthesia Hx:  No problems with previous Anesthesia  History of prior surgery of interest to airway management  or planning:  Denies Personal Hx of Anesthesia complications.   Social:  Former Smoker    Cardiovascular:  Cardiovascular Normal Exercise tolerance: good  Denies Hypertension.  Denies MI.      Pulmonary:   Denies Asthma.  Denies Sleep Apnea.    Renal/:   Denies Chronic Renal Disease.     Hepatic/GI:   GERD Denies Liver Disease. S/p Hiatal hernia repair 2019    Neurological:  Neurology Normal Denies Seizures.    Endocrine:  Endocrine Normal Denies Diabetes. Denies Hypothyroidism.  Denies Hyperthyroidism.        Physical Exam  General: Well nourished, Cooperative, Alert and Oriented    Airway:  Mallampati: II           Anesthesia Plan  Type of Anesthesia, risks & benefits discussed:    Anesthesia Type: Gen Natural Airway  Intra-op Monitoring Plan: Standard ASA Monitors  Post Op Pain Control Plan: multimodal analgesia  Induction:  IV  Informed Consent: Informed consent signed with the Patient and all parties understand the risks and agree with anesthesia plan.  All questions answered.   ASA Score: 2  Day of Surgery Review of History & Physical: H&P Update referred to the surgeon/provider.    Ready For Surgery From Anesthesia Perspective.     .

## 2023-07-19 NOTE — PROVATION PATIENT INSTRUCTIONS
Discharge Summary/Instructions after an Endoscopic Procedure  Patient Name: Aspen Cheng  Patient MRN: [id number]  Patient YOB: 1995 Wednesday, July 19, 2023  Aaron Monaco MD  Dear patient,  As a result of recent federal legislation (The Federal Cures Act), you may   receive lab or pathology results from your procedure in your [facility]   account before your physician is able to contact you. Your physician or   their representative will relay the results to you with their   recommendations at their soonest availability.  Thank you,  Your health is very important to us during the Covid Crisis. Following your   procedure today, you will receive a daily text for 2 weeks asking about   signs or symptoms of Covid 19.  Please respond to this text when you   receive it so we can follow up and keep you as safe as possible.   RESTRICTIONS:  During your procedure today, you received medications for sedation.  These   medications may affect your judgment, balance and coordination.  Therefore,   for 24 hours, you have the following restrictions:   - DO NOT drive a car, operate machinery, make legal/financial decisions,   sign important papers or drink alcohol.    ACTIVITY:  Today: no heavy lifting, straining or running due to procedural   sedation/anesthesia.  The following day: return to full activity including work.  DIET:  Eat and drink normally unless instructed otherwise.     TREATMENT FOR COMMON SIDE EFFECTS:  - Mild abdominal pain, nausea, belching, bloating or excessive gas:  rest,   eat lightly and use a heating pad.  - Sore Throat: treat with throat lozenges and/or gargle with warm salt   water.  - Because air was used during the procedure, expelling large amounts of air   from your rectum or belching is normal.  - If a bowel prep was taken, you may not have a bowel movement for 1-3 days.    This is normal.  SYMPTOMS TO WATCH FOR AND REPORT TO YOUR PHYSICIAN:  1. Abdominal pain or bloating,  other than gas cramps.  2. Chest pain.  3. Back pain.  4. Signs of infection such as: chills or fever occurring within 24 hours   after the procedure.  5. Rectal bleeding, which would show as bright red, maroon, or black stools.   (A tablespoon of blood from the rectum is not serious, especially if   hemorrhoids are present.)  6. Vomiting.  7. Weakness or dizziness.  GO DIRECTLY TO THE NEAREST EMERGENCY ROOM IF YOU HAVE ANY OF THE FOLLOWING:      Difficulty breathing              Chills and/or fever over 101 F   Persistent vomiting and/or vomiting blood   Severe abdominal pain   Severe chest pain   Black, tarry stools   Bleeding- more than one tablespoon   Any other symptom or condition that you feel may need urgent attention  Your doctor recommends these additional instructions:  If any biopsies were taken, your doctors clinic will contact you in 1 to 2   weeks with any results.  - Discharge patient to home.   - Resume previous diet.   - Continue present medications.   - Await pathology results.   - Perform a colonoscopy as previously scheduled.  For questions, problems or results please call your physician - Aaron Monaco MD.  EMERGENCY PHONE NUMBER: [phone],  LAB RESULTS: [phone]  IF A COMPLICATION OR EMERGENCY SITUATION ARISES AND YOU ARE UNABLE TO REACH   YOUR PHYSICIAN - GO DIRECTLY TO THE EMERGENCY ROOM.  Aaron Monaco MD  7/19/2023 10:41:17 AM  This report has been verified and signed electronically.  Dear patient,  As a result of recent federal legislation (The Federal Cures Act), you may   receive lab or pathology results from your procedure in your [facility]   account before your physician is able to contact you. Your physician or   their representative will relay the results to you with their   recommendations at their soonest availability.  Thank you,  PROVATION

## 2023-07-19 NOTE — H&P
Short Stay Endoscopy History and Physical    PCP - Primary Doctor No    Procedure - EGD/colonoscopy  ASA - III  Mallampati - per anesthesia  History of Anesthesia problems - no  Family history Anesthesia problems - no     HPI:  This is a 27 y.o. female here for evaluation of : Abd pain. Diarrhea      ROS:  Constitutional: No fevers, chills, No weight loss  ENT: No allergies  CV: No chest pain  Pulm: No shortness of breath  GI: see HPI  Derm: No rash    Medical History:  has a past medical history of GERD (gastroesophageal reflux disease).    Surgical History:  has a past surgical history that includes Appendectomy; Cholecystectomy;  section; Pelvic laparoscopy; and Hernia repair.    Family History: family history is not on file.. Otherwise no colon cancer, inflammatory bowel disease, or GI malignancies.    Social History:  reports that she has never smoked. She has never used smokeless tobacco. She reports current alcohol use. She reports that she does not use drugs.    Review of patient's allergies indicates:   Allergen Reactions    Sulfa (sulfonamide antibiotics)        Medications:   Medications Prior to Admission   Medication Sig Dispense Refill Last Dose    amitriptyline (ELAVIL) 25 MG tablet TK 1 T PO QHS  1     amoxicillin-clavulanate 875-125mg (AUGMENTIN) 875-125 mg per tablet Take 1 tablet by mouth 2 (two) times daily. (Patient not taking: Reported on 2020) 14 tablet 0     cholestyramine (QUESTRAN) 4 gram packet Take 1 packet (4 g total) by mouth 2 (two) times daily. 180 packet 3     colestipol (COLESTID) 1 gram Tab Take 1 g by mouth.       esomeprazole (NEXIUM) 20 MG capsule Take 20 mg by mouth.       etonogestrel-ethinyl estradiol (NUVARING) 0.12-0.015 mg/24 hr vaginal ring INSERT 1 RING VAGINALLY AND LEAVE IN PLACE FOR 3 WEEKS THEN REMOVE FOR 1 WEEK (Patient not taking: Reported on 3/16/2020) 1 each 12     HYDROcodone-acetaminophen (NORCO) 5-325 mg per tablet Take 1 tablet by mouth every 6  (six) hours as needed. (Patient not taking: Reported on 5/5/2020) 12 tablet 0     MOTOFEN 1-0.025 mg Tab TK 1 T PO  QID PRF DIARRHEA  5     naproxen (NAPROSYN) 500 MG tablet Take 1 tablet (500 mg total) by mouth 2 (two) times daily with meals. (Patient not taking: Reported on 5/3/2023) 30 tablet 0     pantoprazole (PROTONIX) 40 MG tablet TK 1 T PO BID 30 MINUTES  TO 1 HOUR B MEALS  2     sodium,potassium,mag sulfates (SUPREP BOWEL PREP KIT) 17.5-3.13-1.6 gram SolR Take 177 mLs by mouth once daily. 1 each 0     triamcinolone acetonide 0.1% (KENALOG) 0.1 % cream Apply topically 2 (two) times daily. 45 g 1          Objective Findings:    Vital Signs: see nursing notes  Physical Exam:  General Appearance: Well appearing in no acute distress  Eyes:    No scleral icterus  ENT: Neck supple  Lungs: CTA anteriorly  Heart:  S1, S2 normal, no murmurs heard  Abdomen: Soft, non tender, non distended with positive bowel sounds. No hepatosplenomegaly, ascites, or mass  Extremities: no edema  Skin: No rash      Labs:  Lab Results   Component Value Date    WBC 8.57 04/20/2023    HGB 13.4 04/20/2023    HCT 39.0 04/20/2023     04/20/2023    ALT 12 04/20/2023    AST 14 04/20/2023     04/20/2023    K 4.1 04/20/2023     04/20/2023    CREATININE 0.8 04/20/2023    BUN 12 04/20/2023    CO2 25 04/20/2023       I have explained the risks and benefits of endoscopy procedures to the patient including but not limited to bleeding, perforation, infection, and death.    Aaron Monaco MD

## 2023-07-19 NOTE — ANESTHESIA POSTPROCEDURE EVALUATION
Anesthesia Post Evaluation    Patient: Aspen Cheng    Procedure(s) Performed: Procedure(s) (LRB):  COLONOSCOPY (N/A)  EGD (ESOPHAGOGASTRODUODENOSCOPY) (N/A)    Final Anesthesia Type: general      Patient location during evaluation: PACU  Patient participation: Yes- Able to Participate  Level of consciousness: awake  Post-procedure vital signs: reviewed and stable  Pain management: adequate  Airway patency: patent    PONV status at discharge: No PONV  Anesthetic complications: no      Cardiovascular status: blood pressure returned to baseline  Respiratory status: unassisted  Hydration status: euvolemic  Follow-up not needed.          Vitals Value Taken Time   /69 07/19/23 1125   Temp 36.1 °C (97 °F) 07/19/23 1055   Pulse 62 07/19/23 1125   Resp 18 07/19/23 1125   SpO2 99 % 07/19/23 1125         Event Time   Out of Recovery 11:34:39         Pain/Kirsty Score: Kirsty Score: 10 (7/19/2023 11:25 AM)

## 2023-07-19 NOTE — PROVATION PATIENT INSTRUCTIONS
Discharge Summary/Instructions after an Endoscopic Procedure  Patient Name: Aspen Cehng  Patient MRN: [id number]  Patient YOB: 1995 Wednesday, July 19, 2023  Aaron Monaco MD  Dear patient,  As a result of recent federal legislation (The Federal Cures Act), you may   receive lab or pathology results from your procedure in your [facility]   account before your physician is able to contact you. Your physician or   their representative will relay the results to you with their   recommendations at their soonest availability.  Thank you,  Your health is very important to us during the Covid Crisis. Following your   procedure today, you will receive a daily text for 2 weeks asking about   signs or symptoms of Covid 19.  Please respond to this text when you   receive it so we can follow up and keep you as safe as possible.   RESTRICTIONS:  During your procedure today, you received medications for sedation.  These   medications may affect your judgment, balance and coordination.  Therefore,   for 24 hours, you have the following restrictions:   - DO NOT drive a car, operate machinery, make legal/financial decisions,   sign important papers or drink alcohol.    ACTIVITY:  Today: no heavy lifting, straining or running due to procedural   sedation/anesthesia.  The following day: return to full activity including work.  DIET:  Eat and drink normally unless instructed otherwise.     TREATMENT FOR COMMON SIDE EFFECTS:  - Mild abdominal pain, nausea, belching, bloating or excessive gas:  rest,   eat lightly and use a heating pad.  - Sore Throat: treat with throat lozenges and/or gargle with warm salt   water.  - Because air was used during the procedure, expelling large amounts of air   from your rectum or belching is normal.  - If a bowel prep was taken, you may not have a bowel movement for 1-3 days.    This is normal.  SYMPTOMS TO WATCH FOR AND REPORT TO YOUR PHYSICIAN:  1. Abdominal pain or bloating,  other than gas cramps.  2. Chest pain.  3. Back pain.  4. Signs of infection such as: chills or fever occurring within 24 hours   after the procedure.  5. Rectal bleeding, which would show as bright red, maroon, or black stools.   (A tablespoon of blood from the rectum is not serious, especially if   hemorrhoids are present.)  6. Vomiting.  7. Weakness or dizziness.  GO DIRECTLY TO THE NEAREST EMERGENCY ROOM IF YOU HAVE ANY OF THE FOLLOWING:      Difficulty breathing              Chills and/or fever over 101 F   Persistent vomiting and/or vomiting blood   Severe abdominal pain   Severe chest pain   Black, tarry stools   Bleeding- more than one tablespoon   Any other symptom or condition that you feel may need urgent attention  Your doctor recommends these additional instructions:  If any biopsies were taken, your doctors clinic will contact you in 1 to 2   weeks with any results.  - Discharge patient to home.   - Resume previous diet.   - Continue present medications.   - Await pathology results.   - If the pathology report reveals adenomatous tissue, then repeat the   colonoscopy for surveillance in 5 years.   - If the pathology report reveals no adenomatous tissue, then repeat the   colonoscopy for screening purposes at age 44.   - Patient has a contact number available for emergencies.  The signs and   symptoms of potential delayed complications were discussed with the   patient.  Return to normal activities tomorrow.  Written discharge   instructions were provided to the patient.  For questions, problems or results please call your physician - Aaron Monaco MD.  EMERGENCY PHONE NUMBER: [phone],  LAB RESULTS: [phone]  IF A COMPLICATION OR EMERGENCY SITUATION ARISES AND YOU ARE UNABLE TO REACH   YOUR PHYSICIAN - GO DIRECTLY TO THE EMERGENCY ROOM.  Aaron Monaco MD  7/19/2023 10:55:02 AM  This report has been verified and signed electronically.  Dear patient,  As a result of  recent federal legislation (The Federal Cures Act), you may   receive lab or pathology results from your procedure in your [facility]   account before your physician is able to contact you. Your physician or   their representative will relay the results to you with their   recommendations at their soonest availability.  Thank you,  PROVATION

## 2023-07-19 NOTE — TRANSFER OF CARE
"Anesthesia Transfer of Care Note    Patient: Aspen Cheng    Procedure(s) Performed: Procedure(s) (LRB):  COLONOSCOPY (N/A)  EGD (ESOPHAGOGASTRODUODENOSCOPY) (N/A)    Patient location: GI    Anesthesia Type: general    Transport from OR: Transported from OR on room air with adequate spontaneous ventilation    Post pain: adequate analgesia    Post assessment: no apparent anesthetic complications and tolerated procedure well    Level of consciousness: awake    Nausea/Vomiting: no nausea/vomiting    Complications: none    Transfer of care protocol was followed      Last vitals:   Visit Vitals  /68 (BP Location: Left arm, Patient Position: Lying)   Pulse 64   Temp 37.2 °C (99 °F) (Skin)   Resp 18   Ht 5' 4" (1.626 m)   Wt 74.4 kg (164 lb)   LMP 07/11/2023 (Exact Date)   SpO2 97%   Breastfeeding No   BMI 28.15 kg/m²     "

## 2023-07-25 ENCOUNTER — TELEPHONE (OUTPATIENT)
Dept: GASTROENTEROLOGY | Facility: CLINIC | Age: 28
End: 2023-07-25
Payer: COMMERCIAL

## 2023-07-25 NOTE — TELEPHONE ENCOUNTER
----- Message from Aaron Monaco MD sent at 7/25/2023  8:15 AM CDT -----  Contact: pt  Maybe check on her again to make sure she's feeling better  ----- Message -----  From: Ivania Gao MA  Sent: 7/20/2023   2:55 PM CDT  To: Aaron Monaco MD    Patient said that she is having abd pain since having the procedure. Please advise.   ----- Message -----  From: Jose Maria Leiva  Sent: 7/20/2023   1:55 PM CDT  To: [facility] Staff    Type: Requesting to speak with nurse        Who Called: PT  Regarding: R abdominal pain. Seeking medical advice.  Would the patient rather a call back or a response via [facility]? Call back  Best Call Back Number: [phone]  Additional Information:

## 2023-07-26 LAB
FINAL PATHOLOGIC DIAGNOSIS: NORMAL
GROSS: NORMAL
Lab: NORMAL
MICROSCOPIC EXAM: NORMAL

## 2023-08-07 NOTE — PROGRESS NOTES
Path reviewed. No cause of diarrhea noted. Colon polyp benign but premalignant. Repeat colonoscopy in 5 years. F/u with Katelin

## 2023-08-09 ENCOUNTER — OFFICE VISIT (OUTPATIENT)
Dept: OTOLARYNGOLOGY | Facility: CLINIC | Age: 28
End: 2023-08-09
Payer: COMMERCIAL

## 2023-08-09 VITALS
WEIGHT: 164.38 LBS | HEART RATE: 80 BPM | DIASTOLIC BLOOD PRESSURE: 81 MMHG | BODY MASS INDEX: 28.21 KG/M2 | SYSTOLIC BLOOD PRESSURE: 118 MMHG

## 2023-08-09 DIAGNOSIS — H60.312 CHRONIC DIFFUSE OTITIS EXTERNA OF LEFT EAR: Primary | Chronic | ICD-10-CM

## 2023-08-09 DIAGNOSIS — J34.3 HYPERTROPHY OF INFERIOR NASAL TURBINATE: Chronic | ICD-10-CM

## 2023-08-09 DIAGNOSIS — J30.9 CHRONIC ALLERGIC RHINITIS: Chronic | ICD-10-CM

## 2023-08-09 PROCEDURE — 99204 OFFICE O/P NEW MOD 45 MIN: CPT | Mod: S$GLB,,, | Performed by: OTOLARYNGOLOGY

## 2023-08-09 PROCEDURE — 1160F RVW MEDS BY RX/DR IN RCRD: CPT | Mod: CPTII,S$GLB,, | Performed by: OTOLARYNGOLOGY

## 2023-08-09 PROCEDURE — 3079F DIAST BP 80-89 MM HG: CPT | Mod: CPTII,S$GLB,, | Performed by: OTOLARYNGOLOGY

## 2023-08-09 PROCEDURE — 3074F SYST BP LT 130 MM HG: CPT | Mod: CPTII,S$GLB,, | Performed by: OTOLARYNGOLOGY

## 2023-08-09 PROCEDURE — 1160F PR REVIEW ALL MEDS BY PRESCRIBER/CLIN PHARMACIST DOCUMENTED: ICD-10-PCS | Mod: CPTII,S$GLB,, | Performed by: OTOLARYNGOLOGY

## 2023-08-09 PROCEDURE — 99999 PR PBB SHADOW E&M-EST. PATIENT-LVL III: CPT | Mod: PBBFAC,,, | Performed by: OTOLARYNGOLOGY

## 2023-08-09 PROCEDURE — 3079F PR MOST RECENT DIASTOLIC BLOOD PRESSURE 80-89 MM HG: ICD-10-PCS | Mod: CPTII,S$GLB,, | Performed by: OTOLARYNGOLOGY

## 2023-08-09 PROCEDURE — 3074F PR MOST RECENT SYSTOLIC BLOOD PRESSURE < 130 MM HG: ICD-10-PCS | Mod: CPTII,S$GLB,, | Performed by: OTOLARYNGOLOGY

## 2023-08-09 PROCEDURE — 87077 CULTURE AEROBIC IDENTIFY: CPT | Performed by: OTOLARYNGOLOGY

## 2023-08-09 PROCEDURE — 99999 PR PBB SHADOW E&M-EST. PATIENT-LVL III: ICD-10-PCS | Mod: PBBFAC,,, | Performed by: OTOLARYNGOLOGY

## 2023-08-09 PROCEDURE — 87186 SC STD MICRODIL/AGAR DIL: CPT | Performed by: OTOLARYNGOLOGY

## 2023-08-09 PROCEDURE — 99204 PR OFFICE/OUTPT VISIT, NEW, LEVL IV, 45-59 MIN: ICD-10-PCS | Mod: S$GLB,,, | Performed by: OTOLARYNGOLOGY

## 2023-08-09 PROCEDURE — 1159F PR MEDICATION LIST DOCUMENTED IN MEDICAL RECORD: ICD-10-PCS | Mod: CPTII,S$GLB,, | Performed by: OTOLARYNGOLOGY

## 2023-08-09 PROCEDURE — 3008F PR BODY MASS INDEX (BMI) DOCUMENTED: ICD-10-PCS | Mod: CPTII,S$GLB,, | Performed by: OTOLARYNGOLOGY

## 2023-08-09 PROCEDURE — 1159F MED LIST DOCD IN RCRD: CPT | Mod: CPTII,S$GLB,, | Performed by: OTOLARYNGOLOGY

## 2023-08-09 PROCEDURE — 3008F BODY MASS INDEX DOCD: CPT | Mod: CPTII,S$GLB,, | Performed by: OTOLARYNGOLOGY

## 2023-08-09 PROCEDURE — 87070 CULTURE OTHR SPECIMN AEROBIC: CPT | Performed by: OTOLARYNGOLOGY

## 2023-08-09 RX ORDER — NYSTATIN AND TRIAMCINOLONE ACETONIDE 100000; 1 [USP'U]/G; MG/G
OINTMENT TOPICAL 2 TIMES DAILY
Qty: 15 G | Refills: 0 | Status: SHIPPED | OUTPATIENT
Start: 2023-08-09 | End: 2023-10-05

## 2023-08-09 RX ORDER — AZELASTINE 1 MG/ML
1 SPRAY, METERED NASAL 2 TIMES DAILY
Qty: 30 ML | Refills: 0 | Status: SHIPPED | OUTPATIENT
Start: 2023-08-09 | End: 2023-10-05

## 2023-08-09 RX ORDER — MUPIROCIN 20 MG/G
OINTMENT TOPICAL 2 TIMES DAILY
Qty: 15 G | Refills: 0 | Status: SHIPPED | OUTPATIENT
Start: 2023-08-09 | End: 2023-08-19

## 2023-08-09 NOTE — PROGRESS NOTES
Chief Complaint   Patient presents with    Otitis Media    Ear Drainage     Itching and also paining in left side    .     HPI: Aspen Cheng is 28 y.o. female who isself-referred for evaluation of left ear pain.  She reports the problem has been present off and on for 8 months. She has been in to urgent care where she was prescribed oral antibiotics and ear drops. She was last on antibiotics in April.  Symptoms include otorrhea and itching. She has tried peroxide on her own without relief. Patient denies hearing loss, post-auricular pain, tinnitus. She has not had any otologic surgeries. She has year round nasal congestion and rhinorrhea. She feels that this has been a chronic issue for years.         Past Medical History:   Diagnosis Date    GERD (gastroesophageal reflux disease)      Social History     Socioeconomic History    Marital status:    Tobacco Use    Smoking status: Never    Smokeless tobacco: Never   Substance and Sexual Activity    Alcohol use: Yes     Comment: socially    Drug use: No    Sexual activity: Yes     Partners: Male   Social History Narrative    ** Merged History Encounter **          Past Surgical History:   Procedure Laterality Date    APPENDECTOMY       SECTION      x2    CHOLECYSTECTOMY      COLONOSCOPY N/A 2023    Procedure: COLONOSCOPY;  Surgeon: Aaron Monaco MD;  Location: [facility];  Service: Endoscopy;  Laterality: N/A;    ESOPHAGOGASTRODUODENOSCOPY N/A 2023    Procedure: EGD (ESOPHAGOGASTRODUODENOSCOPY);  Surgeon: Aaron Monaco MD;  Location: [facility];  Service: Endoscopy;  Laterality: N/A;    HERNIA REPAIR      with LINX procedure    PELVIC LAPAROSCOPY       Family History   Problem Relation Age of Onset    Breast cancer Neg Hx     Colon cancer Neg Hx     Ovarian cancer Neg Hx            Review of Systems  General: negative for chills, fever or weight loss  Psychological: negative for mood changes or depression  Ophthalmic:  negative for blurry vision, photophobia or eye pain  ENT: see HPI  Respiratory: no cough, shortness of breath, or wheezing  Cardiovascular: no chest pain or dyspnea on exertion  Gastrointestinal: no abdominal pain, change in bowel habits, or black/ bloody stools  Musculoskeletal: negative for gait disturbance or muscular weakness  Neurological: no syncope or seizures; no ataxia  Dermatological: negative for puritis,  rash and jaundice  Hematologic/lymphatic: no easy bruising, no new lumps or bumps      Physical Exam:    Vitals:    08/09/23 1315   BP: 118/81   Pulse: 80       Constitutional: Well appearing / communicating without difficutly.  NAD.  Eyes: EOM I Bilaterally  Head/Face: Normocephalic.  Negative paranasal sinus pressure/tenderness.  Salivary glands WNL.  House Brackmann I Bilaterally.    Right Ear: Auricle normal appearance. External Auditory Canal within normal limits no lesions or masses,TM w/o masses/lesions/perforations. TM mobility noted.   Left Ear: Auricle normal appearance. External Auditory Canal within normal limits no lesions or masses,TM w/o masses/lesions/perforations. TM mobility noted.  Nose: No gross nasal septal deviation. Inferior Turbinates 3+ bilaterally. No septal perforation. No masses/lesions. External nasal skin appears normal without masses/lesions.  Oral Cavity: Gingiva/lips within normal limits.  Dentition/gingiva healthy appearing. Mucus membranes moist. Floor of mouth soft, no masses palpated. Oral Tongue mobile. Hard Palate appears normal.    Oropharynx: Base of tongue appears normal. No masses/lesions noted. Tonsillar fossa/pharyngeal wall without lesions. Posterior oropharynx WNL.  Soft palate without masses. Midline uvula.   Neck/Lymphatic: No LAD I-VI bilaterally.  No thyromegaly.  No masses noted on exam.              Assessment:    ICD-10-CM ICD-9-CM    1. Chronic diffuse otitis externa of left ear  H60.312 380.23 Aerobic culture      2. Chronic allergic rhinitis   J30.9 477.9       3. Hypertrophy of inferior nasal turbinate  J34.3 478.0         The primary encounter diagnosis was Chronic diffuse otitis externa of left ear. Diagnoses of Chronic allergic rhinitis and Hypertrophy of inferior nasal turbinate were also pertinent to this visit.      Plan:  Orders Placed This Encounter   Procedures    Aerobic culture     Culture of the left ear taken today  Keep left ear dry  Start mupirocin/mycolog ointments to left ear BID.   Start Astelin 1 spray per nostril BID   Follow up in approximately 4 weeks.     Lizz Cabrera MD

## 2023-08-11 LAB — BACTERIA SPEC AEROBE CULT: ABNORMAL

## 2023-08-30 ENCOUNTER — OFFICE VISIT (OUTPATIENT)
Dept: OTOLARYNGOLOGY | Facility: CLINIC | Age: 28
End: 2023-08-30
Payer: COMMERCIAL

## 2023-08-30 VITALS
WEIGHT: 165.44 LBS | HEART RATE: 80 BPM | SYSTOLIC BLOOD PRESSURE: 118 MMHG | BODY MASS INDEX: 28.4 KG/M2 | DIASTOLIC BLOOD PRESSURE: 79 MMHG

## 2023-08-30 DIAGNOSIS — J34.3 HYPERTROPHY OF INFERIOR NASAL TURBINATE: ICD-10-CM

## 2023-08-30 DIAGNOSIS — J30.9 CHRONIC ALLERGIC RHINITIS: Chronic | ICD-10-CM

## 2023-08-30 DIAGNOSIS — H60.312 CHRONIC DIFFUSE OTITIS EXTERNA OF LEFT EAR: Primary | Chronic | ICD-10-CM

## 2023-08-30 PROCEDURE — 3074F SYST BP LT 130 MM HG: CPT | Mod: CPTII,S$GLB,, | Performed by: OTOLARYNGOLOGY

## 2023-08-30 PROCEDURE — 3078F PR MOST RECENT DIASTOLIC BLOOD PRESSURE < 80 MM HG: ICD-10-PCS | Mod: CPTII,S$GLB,, | Performed by: OTOLARYNGOLOGY

## 2023-08-30 PROCEDURE — 3074F PR MOST RECENT SYSTOLIC BLOOD PRESSURE < 130 MM HG: ICD-10-PCS | Mod: CPTII,S$GLB,, | Performed by: OTOLARYNGOLOGY

## 2023-08-30 PROCEDURE — 99999 PR PBB SHADOW E&M-EST. PATIENT-LVL III: ICD-10-PCS | Mod: PBBFAC,,, | Performed by: OTOLARYNGOLOGY

## 2023-08-30 PROCEDURE — 1159F PR MEDICATION LIST DOCUMENTED IN MEDICAL RECORD: ICD-10-PCS | Mod: CPTII,S$GLB,, | Performed by: OTOLARYNGOLOGY

## 2023-08-30 PROCEDURE — 3078F DIAST BP <80 MM HG: CPT | Mod: CPTII,S$GLB,, | Performed by: OTOLARYNGOLOGY

## 2023-08-30 PROCEDURE — 3008F PR BODY MASS INDEX (BMI) DOCUMENTED: ICD-10-PCS | Mod: CPTII,S$GLB,, | Performed by: OTOLARYNGOLOGY

## 2023-08-30 PROCEDURE — 3008F BODY MASS INDEX DOCD: CPT | Mod: CPTII,S$GLB,, | Performed by: OTOLARYNGOLOGY

## 2023-08-30 PROCEDURE — 1160F PR REVIEW ALL MEDS BY PRESCRIBER/CLIN PHARMACIST DOCUMENTED: ICD-10-PCS | Mod: CPTII,S$GLB,, | Performed by: OTOLARYNGOLOGY

## 2023-08-30 PROCEDURE — 1160F RVW MEDS BY RX/DR IN RCRD: CPT | Mod: CPTII,S$GLB,, | Performed by: OTOLARYNGOLOGY

## 2023-08-30 PROCEDURE — 99999 PR PBB SHADOW E&M-EST. PATIENT-LVL III: CPT | Mod: PBBFAC,,, | Performed by: OTOLARYNGOLOGY

## 2023-08-30 PROCEDURE — 99214 PR OFFICE/OUTPT VISIT, EST, LEVL IV, 30-39 MIN: ICD-10-PCS | Mod: S$GLB,,, | Performed by: OTOLARYNGOLOGY

## 2023-08-30 PROCEDURE — 99214 OFFICE O/P EST MOD 30 MIN: CPT | Mod: S$GLB,,, | Performed by: OTOLARYNGOLOGY

## 2023-08-30 PROCEDURE — 1159F MED LIST DOCD IN RCRD: CPT | Mod: CPTII,S$GLB,, | Performed by: OTOLARYNGOLOGY

## 2023-08-30 RX ORDER — FLUOCINOLONE ACETONIDE 0.11 MG/ML
3 OIL AURICULAR (OTIC) 2 TIMES DAILY
Qty: 20 ML | Refills: 2 | Status: SHIPPED | OUTPATIENT
Start: 2023-08-30 | End: 2023-09-13

## 2023-08-30 RX ORDER — MOMETASONE FUROATE 50 UG/1
2 SPRAY, METERED NASAL DAILY
Qty: 17 G | Refills: 11 | Status: SHIPPED | OUTPATIENT
Start: 2023-08-30 | End: 2023-10-05

## 2023-08-30 RX ORDER — DOXYCYCLINE HYCLATE 100 MG
100 TABLET ORAL 2 TIMES DAILY
Qty: 28 TABLET | Refills: 0 | Status: SHIPPED | OUTPATIENT
Start: 2023-08-30 | End: 2023-09-13

## 2023-08-30 RX ORDER — MUPIROCIN 20 MG/G
OINTMENT TOPICAL 2 TIMES DAILY
Qty: 15 G | Refills: 0 | Status: SHIPPED | OUTPATIENT
Start: 2023-08-30 | End: 2023-09-13

## 2023-08-30 NOTE — PROGRESS NOTES
Chief Complaint   Patient presents with    Follow-up    Itching     Stated that at one point it was getting better and lately its been back scabbing    .     HPI: Aspen Cheng is 28 y.o. female who isself-referred for evaluation of left ear pain.  She reports the problem has been present off and on for 8 months. She has been in to urgent care where she was prescribed oral antibiotics and ear drops. She was last on antibiotics in April.  Symptoms include otorrhea and itching. She has tried peroxide on her own without relief. Patient denies hearing loss, post-auricular pain, tinnitus. She has not had any otologic surgeries. She has year round nasal congestion and rhinorrhea. She feels that this has been a chronic issue for years.     Interval HPI 2023:  Follow up evaluation. Reports that she has had improvement in the left ear but now feels like the symptoms are back and she is having recurrence of itchiness and scabbing in the left ear. She used mycolog and mupirocin prescribed at last visit. She used it for 10 days and then it started to improve. Symptoms started to recur after she stopped it.       Past Medical History:   Diagnosis Date    GERD (gastroesophageal reflux disease)      Social History     Socioeconomic History    Marital status:    Tobacco Use    Smoking status: Never    Smokeless tobacco: Never   Substance and Sexual Activity    Alcohol use: Yes     Comment: socially    Drug use: No    Sexual activity: Yes     Partners: Male   Social History Narrative    ** Merged History Encounter **          Past Surgical History:   Procedure Laterality Date    APPENDECTOMY       SECTION      x2    CHOLECYSTECTOMY      COLONOSCOPY N/A 2023    Procedure: COLONOSCOPY;  Surgeon: Aaron Monaco MD;  Location: [facility];  Service: Endoscopy;  Laterality: N/A;    ESOPHAGOGASTRODUODENOSCOPY N/A 2023    Procedure: EGD (ESOPHAGOGASTRODUODENOSCOPY);  Surgeon: Aaron Monaco  MD;  Location: [facility];  Service: Endoscopy;  Laterality: N/A;    HERNIA REPAIR      with LINX procedure    PELVIC LAPAROSCOPY       Family History   Problem Relation Age of Onset    Breast cancer Neg Hx     Colon cancer Neg Hx     Ovarian cancer Neg Hx            Review of Systems  General: negative for chills, fever or weight loss  Psychological: negative for mood changes or depression  Ophthalmic: negative for blurry vision, photophobia or eye pain  ENT: see HPI  Respiratory: no cough, shortness of breath, or wheezing  Cardiovascular: no chest pain or dyspnea on exertion  Gastrointestinal: no abdominal pain, change in bowel habits, or black/ bloody stools  Musculoskeletal: negative for gait disturbance or muscular weakness  Neurological: no syncope or seizures; no ataxia  Dermatological: negative for puritis,  rash and jaundice  Hematologic/lymphatic: no easy bruising, no new lumps or bumps      Physical Exam:    Vitals:    08/30/23 1335   BP: 118/79   Pulse: 80       Constitutional: Well appearing / communicating without difficutly.  NAD.  Eyes: EOM I Bilaterally  Head/Face: Normocephalic.  Negative paranasal sinus pressure/tenderness.  Salivary glands WNL.  House Brackmann I Bilaterally.    Right Ear: Auricle normal appearance. External Auditory Canal within normal limits no lesions or masses,TM w/o masses/lesions/perforations. TM mobility noted.   Left Ear: Auricle normal appearance. External Auditory Canal within normal limits no lesions or masses,TM w/o masses/lesions/perforations. TM mobility noted.  Nose: No gross nasal septal deviation. Inferior Turbinates 3+ bilaterally. No septal perforation. No masses/lesions. External nasal skin appears normal without masses/lesions.  Oral Cavity: Gingiva/lips within normal limits.  Dentition/gingiva healthy appearing. Mucus membranes moist. Floor of mouth soft, no masses palpated. Oral Tongue mobile. Hard Palate appears normal.    Oropharynx: Base of tongue appears  normal. No masses/lesions noted. Tonsillar fossa/pharyngeal wall without lesions. Posterior oropharynx WNL.  Soft palate without masses. Midline uvula.   Neck/Lymphatic: No LAD I-VI bilaterally.  No thyromegaly.  No masses noted on exam.      Culture shows Staph aureus from 8/11/2023        Assessment:    ICD-10-CM ICD-9-CM    1. Chronic diffuse otitis externa of left ear  H60.312 380.23       2. Chronic allergic rhinitis  J30.9 477.9       3. Hypertrophy of inferior nasal turbinate  J34.3 478.0           The primary encounter diagnosis was Chronic diffuse otitis externa of left ear. Diagnoses of Chronic allergic rhinitis and Hypertrophy of inferior nasal turbinate were also pertinent to this visit.      Plan:  No orders of the defined types were placed in this encounter.      Keep left ear dry  Start  doxycycline 100 mg PO BID  Start mupirocin ointment to left ear BID   Mupirocin ointment to bilateral nostrils for 6 weeks  Continue Astelin 1 spray per nostril BID     Follow up in approximately 4 weeks.     Lizz Cabrera MD

## 2023-09-02 ENCOUNTER — PATIENT MESSAGE (OUTPATIENT)
Dept: GASTROENTEROLOGY | Facility: CLINIC | Age: 28
End: 2023-09-02
Payer: COMMERCIAL

## 2023-09-07 DIAGNOSIS — K62.5 RECTAL BLEEDING: Primary | ICD-10-CM

## 2023-09-29 ENCOUNTER — OFFICE VISIT (OUTPATIENT)
Dept: OTOLARYNGOLOGY | Facility: CLINIC | Age: 28
End: 2023-09-29
Payer: COMMERCIAL

## 2023-09-29 VITALS
DIASTOLIC BLOOD PRESSURE: 74 MMHG | HEART RATE: 77 BPM | WEIGHT: 167.88 LBS | SYSTOLIC BLOOD PRESSURE: 109 MMHG | BODY MASS INDEX: 28.82 KG/M2

## 2023-09-29 DIAGNOSIS — H60.312 CHRONIC DIFFUSE OTITIS EXTERNA OF LEFT EAR: Primary | ICD-10-CM

## 2023-09-29 DIAGNOSIS — J30.9 CHRONIC ALLERGIC RHINITIS: ICD-10-CM

## 2023-09-29 DIAGNOSIS — J34.3 HYPERTROPHY OF INFERIOR NASAL TURBINATE: ICD-10-CM

## 2023-09-29 PROCEDURE — 1160F RVW MEDS BY RX/DR IN RCRD: CPT | Mod: CPTII,S$GLB,, | Performed by: OTOLARYNGOLOGY

## 2023-09-29 PROCEDURE — 3074F PR MOST RECENT SYSTOLIC BLOOD PRESSURE < 130 MM HG: ICD-10-PCS | Mod: CPTII,S$GLB,, | Performed by: OTOLARYNGOLOGY

## 2023-09-29 PROCEDURE — 3008F BODY MASS INDEX DOCD: CPT | Mod: CPTII,S$GLB,, | Performed by: OTOLARYNGOLOGY

## 2023-09-29 PROCEDURE — 99214 OFFICE O/P EST MOD 30 MIN: CPT | Mod: S$GLB,,, | Performed by: OTOLARYNGOLOGY

## 2023-09-29 PROCEDURE — 1159F MED LIST DOCD IN RCRD: CPT | Mod: CPTII,S$GLB,, | Performed by: OTOLARYNGOLOGY

## 2023-09-29 PROCEDURE — 1159F PR MEDICATION LIST DOCUMENTED IN MEDICAL RECORD: ICD-10-PCS | Mod: CPTII,S$GLB,, | Performed by: OTOLARYNGOLOGY

## 2023-09-29 PROCEDURE — 99214 PR OFFICE/OUTPT VISIT, EST, LEVL IV, 30-39 MIN: ICD-10-PCS | Mod: S$GLB,,, | Performed by: OTOLARYNGOLOGY

## 2023-09-29 PROCEDURE — 3008F PR BODY MASS INDEX (BMI) DOCUMENTED: ICD-10-PCS | Mod: CPTII,S$GLB,, | Performed by: OTOLARYNGOLOGY

## 2023-09-29 PROCEDURE — 3074F SYST BP LT 130 MM HG: CPT | Mod: CPTII,S$GLB,, | Performed by: OTOLARYNGOLOGY

## 2023-09-29 PROCEDURE — 3078F PR MOST RECENT DIASTOLIC BLOOD PRESSURE < 80 MM HG: ICD-10-PCS | Mod: CPTII,S$GLB,, | Performed by: OTOLARYNGOLOGY

## 2023-09-29 PROCEDURE — 99999 PR PBB SHADOW E&M-EST. PATIENT-LVL III: CPT | Mod: PBBFAC,,, | Performed by: OTOLARYNGOLOGY

## 2023-09-29 PROCEDURE — 3078F DIAST BP <80 MM HG: CPT | Mod: CPTII,S$GLB,, | Performed by: OTOLARYNGOLOGY

## 2023-09-29 PROCEDURE — 1160F PR REVIEW ALL MEDS BY PRESCRIBER/CLIN PHARMACIST DOCUMENTED: ICD-10-PCS | Mod: CPTII,S$GLB,, | Performed by: OTOLARYNGOLOGY

## 2023-09-29 PROCEDURE — 99999 PR PBB SHADOW E&M-EST. PATIENT-LVL III: ICD-10-PCS | Mod: PBBFAC,,, | Performed by: OTOLARYNGOLOGY

## 2023-09-29 RX ORDER — AZELASTINE 1 MG/ML
1 SPRAY, METERED NASAL 2 TIMES DAILY
Qty: 30 ML | Refills: 3 | Status: SHIPPED | OUTPATIENT
Start: 2023-09-29 | End: 2023-10-05

## 2023-09-29 RX ORDER — FLUOCINOLONE ACETONIDE 0.11 MG/ML
3 OIL AURICULAR (OTIC) 2 TIMES DAILY
Qty: 20 ML | Refills: 1 | Status: SHIPPED | OUTPATIENT
Start: 2023-09-29 | End: 2023-10-05

## 2023-09-29 RX ORDER — NYSTATIN AND TRIAMCINOLONE ACETONIDE 100000; 1 [USP'U]/G; MG/G
OINTMENT TOPICAL 2 TIMES DAILY
Qty: 15 G | Refills: 0 | Status: SHIPPED | OUTPATIENT
Start: 2023-09-29 | End: 2023-10-05

## 2023-09-29 NOTE — PROGRESS NOTES
Chief Complaint   Patient presents with    Follow-up     A Little Improvement, Still Itchy and Dry   .     HPI: Summer Prosper is 28 y.o. female who isself-referred for evaluation of left ear pain.  She reports the problem has been present off and on for 8 months. She has been in to urgent care where she was prescribed oral antibiotics and ear drops. She was last on antibiotics in April.  Symptoms include otorrhea and itching. She has tried peroxide on her own without relief. Patient denies hearing loss, post-auricular pain, tinnitus. She has not had any otologic surgeries. She has year round nasal congestion and rhinorrhea. She feels that this has been a chronic issue for years.     Interval HPI 2023:  Follow up evaluation. Reports that she has had improvement in the left ear but now feels like the symptoms are back and she is having recurrence of itchiness and scabbing in the left ear. She used mycolog and mupirocin prescribed at last visit. She used it for 10 days and then it started to improve. Symptoms started to recur after she stopped it.     Interval HPI 2023:  Follow up visit. Notes that she has had some improvement in her ears since last visit. Notes continued feeling of itchiness and dryness in both ears. She felt that the mupirocin and doxycycline as directed but did not feel that that helped. She reports she started mycolog back and feels that this has been more helpful.       Past Medical History:   Diagnosis Date    GERD (gastroesophageal reflux disease)      Social History     Socioeconomic History    Marital status:    Tobacco Use    Smoking status: Never    Smokeless tobacco: Never   Substance and Sexual Activity    Alcohol use: Yes     Comment: socially    Drug use: No    Sexual activity: Yes     Partners: Male   Social History Narrative    ** Merged History Encounter **          Past Surgical History:   Procedure Laterality Date    APPENDECTOMY       SECTION      x2     CHOLECYSTECTOMY      COLONOSCOPY N/A 7/19/2023    Procedure: COLONOSCOPY;  Surgeon: Aaron Monaco MD;  Location: [facility];  Service: Endoscopy;  Laterality: N/A;    ESOPHAGOGASTRODUODENOSCOPY N/A 7/19/2023    Procedure: EGD (ESOPHAGOGASTRODUODENOSCOPY);  Surgeon: Aaron Monaco MD;  Location: [facility];  Service: Endoscopy;  Laterality: N/A;    HERNIA REPAIR      with LINX procedure    PELVIC LAPAROSCOPY       Family History   Problem Relation Age of Onset    Breast cancer Neg Hx     Colon cancer Neg Hx     Ovarian cancer Neg Hx            Review of Systems  General: negative for chills, fever or weight loss  Psychological: negative for mood changes or depression  Ophthalmic: negative for blurry vision, photophobia or eye pain  ENT: see HPI  Respiratory: no cough, shortness of breath, or wheezing  Cardiovascular: no chest pain or dyspnea on exertion  Gastrointestinal: no abdominal pain, change in bowel habits, or black/ bloody stools  Musculoskeletal: negative for gait disturbance or muscular weakness  Neurological: no syncope or seizures; no ataxia  Dermatological: negative for puritis,  rash and jaundice  Hematologic/lymphatic: no easy bruising, no new lumps or bumps      Physical Exam:    Vitals:    09/29/23 1315   BP: 109/74   Pulse: 77         Constitutional: Well appearing / communicating without difficutly.  NAD.  Eyes: EOM I Bilaterally  Head/Face: Normocephalic.  Negative paranasal sinus pressure/tenderness.  Salivary glands WNL.  House Brackmann I Bilaterally.    Right Ear: Auricle normal appearance. External Auditory Canal within normal limits no lesions or masses,TM w/o masses/lesions/perforations. TM mobility noted.   Left Ear: Auricle normal appearance. External Auditory Canal within normal limits no lesions or masses,TM w/o masses/lesions/perforations. TM mobility noted.  Nose: No gross nasal septal deviation. Inferior Turbinates 3+ bilaterally. No septal perforation. No  masses/lesions. External nasal skin appears normal without masses/lesions.  Oral Cavity: Gingiva/lips within normal limits.  Dentition/gingiva healthy appearing. Mucus membranes moist. Floor of mouth soft, no masses palpated. Oral Tongue mobile. Hard Palate appears normal.    Oropharynx: Base of tongue appears normal. No masses/lesions noted. Tonsillar fossa/pharyngeal wall without lesions. Posterior oropharynx WNL.  Soft palate without masses. Midline uvula.   Neck/Lymphatic: No LAD I-VI bilaterally.  No thyromegaly.  No masses noted on exam.      Culture shows Staph aureus from 8/11/2023        Assessment:    ICD-10-CM ICD-9-CM    1. Chronic diffuse otitis externa of left ear  H60.312 380.23       2. Chronic allergic rhinitis  J30.9 477.9       3. Hypertrophy of inferior nasal turbinate  J34.3 478.0             The primary encounter diagnosis was Chronic diffuse otitis externa of left ear. Diagnoses of Chronic allergic rhinitis and Hypertrophy of inferior nasal turbinate were also pertinent to this visit.      Plan:  No orders of the defined types were placed in this encounter.      Keep left ear dry  Derm otic drops to the left ear BID for 14 days  Resend Astelin 1 spray per nostril BID     Follow up if symptoms worsen or fail to improve.     Lizz Cabrera MD

## 2023-10-05 ENCOUNTER — OFFICE VISIT (OUTPATIENT)
Dept: SURGERY | Facility: CLINIC | Age: 28
End: 2023-10-05
Payer: COMMERCIAL

## 2023-10-05 VITALS
BODY MASS INDEX: 28.98 KG/M2 | SYSTOLIC BLOOD PRESSURE: 110 MMHG | HEIGHT: 64 IN | WEIGHT: 169.75 LBS | DIASTOLIC BLOOD PRESSURE: 78 MMHG

## 2023-10-05 DIAGNOSIS — K64.0 GRADE I HEMORRHOIDS: Primary | ICD-10-CM

## 2023-10-05 DIAGNOSIS — K62.5 RECTAL BLEEDING: ICD-10-CM

## 2023-10-05 PROCEDURE — 46221 LIGATION OF HEMORRHOID(S): CPT | Mod: S$GLB,,, | Performed by: COLON & RECTAL SURGERY

## 2023-10-05 PROCEDURE — 3074F PR MOST RECENT SYSTOLIC BLOOD PRESSURE < 130 MM HG: ICD-10-PCS | Mod: CPTII,S$GLB,, | Performed by: COLON & RECTAL SURGERY

## 2023-10-05 PROCEDURE — 99999 PR PBB SHADOW E&M-EST. PATIENT-LVL III: CPT | Mod: PBBFAC,,, | Performed by: COLON & RECTAL SURGERY

## 2023-10-05 PROCEDURE — 99203 OFFICE O/P NEW LOW 30 MIN: CPT | Mod: 25,S$GLB,, | Performed by: COLON & RECTAL SURGERY

## 2023-10-05 PROCEDURE — 3074F SYST BP LT 130 MM HG: CPT | Mod: CPTII,S$GLB,, | Performed by: COLON & RECTAL SURGERY

## 2023-10-05 PROCEDURE — 1160F RVW MEDS BY RX/DR IN RCRD: CPT | Mod: CPTII,S$GLB,, | Performed by: COLON & RECTAL SURGERY

## 2023-10-05 PROCEDURE — 99999 PR PBB SHADOW E&M-EST. PATIENT-LVL III: ICD-10-PCS | Mod: PBBFAC,,, | Performed by: COLON & RECTAL SURGERY

## 2023-10-05 PROCEDURE — 1159F MED LIST DOCD IN RCRD: CPT | Mod: CPTII,S$GLB,, | Performed by: COLON & RECTAL SURGERY

## 2023-10-05 PROCEDURE — 99203 PR OFFICE/OUTPT VISIT, NEW, LEVL III, 30-44 MIN: ICD-10-PCS | Mod: 25,S$GLB,, | Performed by: COLON & RECTAL SURGERY

## 2023-10-05 PROCEDURE — 1159F PR MEDICATION LIST DOCUMENTED IN MEDICAL RECORD: ICD-10-PCS | Mod: CPTII,S$GLB,, | Performed by: COLON & RECTAL SURGERY

## 2023-10-05 PROCEDURE — 46221 PR HEMORRHOIDECTOMY INTERNAL RUBBER BAND LIGATIONS: ICD-10-PCS | Mod: S$GLB,,, | Performed by: COLON & RECTAL SURGERY

## 2023-10-05 PROCEDURE — 3008F BODY MASS INDEX DOCD: CPT | Mod: CPTII,S$GLB,, | Performed by: COLON & RECTAL SURGERY

## 2023-10-05 PROCEDURE — 3078F DIAST BP <80 MM HG: CPT | Mod: CPTII,S$GLB,, | Performed by: COLON & RECTAL SURGERY

## 2023-10-05 PROCEDURE — 3078F PR MOST RECENT DIASTOLIC BLOOD PRESSURE < 80 MM HG: ICD-10-PCS | Mod: CPTII,S$GLB,, | Performed by: COLON & RECTAL SURGERY

## 2023-10-05 PROCEDURE — 3008F PR BODY MASS INDEX (BMI) DOCUMENTED: ICD-10-PCS | Mod: CPTII,S$GLB,, | Performed by: COLON & RECTAL SURGERY

## 2023-10-05 PROCEDURE — 1160F PR REVIEW ALL MEDS BY PRESCRIBER/CLIN PHARMACIST DOCUMENTED: ICD-10-PCS | Mod: CPTII,S$GLB,, | Performed by: COLON & RECTAL SURGERY

## 2023-10-05 NOTE — PROGRESS NOTES
CRS Office Visit History and Physical    Referring Md:   Katelin Seymour, Np  [address] [facility]  [address]  [place] [address]    SUBJECTIVE:     Chief Complaint: rectal bleeding    History of Present Illness:  The patient is a new patient to this practice.   Course is as follows:  Patient is a 28 y.o. female presents with rectal bleeding.     Longstanding history of diarrhea.  She has 5-10 BM per day and spends < 5min on the toilet for each BM.  She was previously seen by GI further workup and treatment of diarrhea but has stopped all medications secondary to ineffectiveness.  She presents for evaluation for bleeding.  She is bleeding once every week to every other week.  No prolapse.  No pain.  No family history of colon rectal cancer inflammatory bowel disease.  Nonsmoker.  She does have intermittent fecal urgency.    Last Colonoscopy: 7/19/23:  Impression:            - The examined portion of the ileum was normal.                          - One 4 mm polyp in the descending colon, removed                          with a cold snare. Resected and retrieved.                          - The examination was otherwise normal on direct                          and retroflexion views.                          - Biopsies were taken with a cold forceps from the                          ascending colon, transverse colon, sigmoid colon                          and rectum for evaluation of microscopic colitis.   Pathology:  3. Colon, random, biopsy:   - Benign colonic mucosa without significant histopathologic alteration   - Negative for active colitis   - Negative for architectural disorder   - Negative for granulomas or viral inclusions   - Negative for dysplasia or carcinoma     4. Colon, sigmoid polypectomy, biopsy:   - Tubular adenoma   - Negative for high-grade dysplasia or carcinoma     Review of patient's allergies indicates:   Allergen Reactions    Sulfa (sulfonamide antibiotics)        Past Medical History:   Diagnosis  "Date    GERD (gastroesophageal reflux disease)      Past Surgical History:   Procedure Laterality Date    APPENDECTOMY       SECTION      x2    CHOLECYSTECTOMY      COLONOSCOPY N/A 2023    Procedure: COLONOSCOPY;  Surgeon: Aaron Monaco MD;  Location: [facility];  Service: Endoscopy;  Laterality: N/A;    ESOPHAGOGASTRODUODENOSCOPY N/A 2023    Procedure: EGD (ESOPHAGOGASTRODUODENOSCOPY);  Surgeon: Aaron Monaco MD;  Location: [facility];  Service: Endoscopy;  Laterality: N/A;    HERNIA REPAIR      with LINX procedure    PELVIC LAPAROSCOPY       Family History   Problem Relation Age of Onset    Breast cancer Neg Hx     Colon cancer Neg Hx     Ovarian cancer Neg Hx      Social History     Tobacco Use    Smoking status: Never    Smokeless tobacco: Never   Substance Use Topics    Alcohol use: Yes     Comment: socially    Drug use: No        Review of Systems:  Review of Systems   Constitutional:  Negative for chills, diaphoresis, fever, malaise/fatigue and weight loss.   HENT:  Negative for congestion.    Respiratory:  Negative for shortness of breath.    Cardiovascular:  Negative for chest pain and leg swelling.   Gastrointestinal:  Positive for diarrhea. Negative for abdominal pain, blood in stool, constipation, nausea and vomiting.   Genitourinary:  Negative for dysuria.   Musculoskeletal:  Negative for back pain and myalgias.   Skin:  Negative for rash.   Neurological:  Negative for dizziness and weakness.   Endo/Heme/Allergies:  Does not bruise/bleed easily.   Psychiatric/Behavioral:  Negative for depression.        OBJECTIVE:     Vital Signs (Most Recent)  /78 (BP Location: Left arm, Patient Position: Sitting, BP Method: Small (Automatic))   Ht 5' 4" (1.626 m)   Wt 77 kg (169 lb 12.1 oz)   BMI 29.14 kg/m²     Physical Exam:  General: White female in no distress   Neuro: alert and oriented x 4.  Moves all extremities.     HEENT: no icterus.  Trachea " midline  Respiratory: respirations are even and unlabored  Cardiac: regular rate  Abdomen:  Soft nontender, no masses  Extremities: Warm dry and intact  Skin: no rashes  Anorectal:  External exam was normal.  Digital exam with normal tone.  Normal squeeze pressure.  No masses.  Anoscopy with grade I right posterior hemorrhoid.  Left side was normal.  Right anterior normal.     Labs: H&H 13 and 39.    Imaging: no recent imaging.       ASSESSMENT/PLAN:     Diagnoses and all orders for this visit:    Grade I hemorrhoids    Rectal bleeding  -     Ambulatory referral/consult to Colorectal Surgery        28 y.o. female with longstanding history of diarrhea with grade I right posterior hemorrhoid and intermittent rectal bleeding.  Banding was offered and performed today without complication.  She can follow up with any future concerns or issues.    Rubber Band Ligation:  Verbal consent was obtained.   Clear plastic anoscope inserted.    Grade I hemorrhoids seen on the right posterior position  Suction applicator was placed above the dentate line.   Rubber bands were deployed in the right posterior position.    0.25% marcaine was injected above the rubber band into the banded hemorrhoidal tissue.   Patient tolerated the procedure well.       AMAURY Cohen MD, FACS, FASCRS  Staff Surgeon  Colon & Rectal Surgery

## 2024-04-29 ENCOUNTER — TELEPHONE (OUTPATIENT)
Dept: DERMATOLOGY | Facility: CLINIC | Age: 29
End: 2024-04-29
Payer: COMMERCIAL

## 2024-04-29 NOTE — TELEPHONE ENCOUNTER
Spoke to pt. Pt verbally agreed and confirmed date and time given. Pt thanked me.     ----- Message from Shirley Ernst sent at 4/29/2024  2:55 PM CDT -----  Type:  Sooner Appointment Request    Caller is requesting a sooner appointment.  Caller declined first available appointment listed below.  Caller will not accept being placed on the waitlist and is requesting a message be sent to doctor.  Name of Caller: Pt   When is the first available appointment? Panels were closed   Symptoms: Mole growing on top of right hand  Would the patient rather a call back or a response via [facility]? Call back   Best Call Back Number: [phone]

## 2024-05-20 ENCOUNTER — OFFICE VISIT (OUTPATIENT)
Dept: DERMATOLOGY | Facility: CLINIC | Age: 29
End: 2024-05-20
Payer: COMMERCIAL

## 2024-05-20 DIAGNOSIS — D23.9 DERMATOFIBROMA: ICD-10-CM

## 2024-05-20 DIAGNOSIS — D48.5 NEOPLASM OF UNCERTAIN BEHAVIOR OF SKIN: ICD-10-CM

## 2024-05-20 DIAGNOSIS — R61 HYPERHIDROSIS: Primary | ICD-10-CM

## 2024-05-20 DIAGNOSIS — R61 HYPERHIDROSIS: ICD-10-CM

## 2024-05-20 PROCEDURE — 99204 OFFICE O/P NEW MOD 45 MIN: CPT | Mod: 25,S$GLB,, | Performed by: DERMATOLOGY

## 2024-05-20 PROCEDURE — 99999 PR PBB SHADOW E&M-EST. PATIENT-LVL III: CPT | Mod: PBBFAC,,, | Performed by: DERMATOLOGY

## 2024-05-20 PROCEDURE — 88305 TISSUE EXAM BY PATHOLOGIST: CPT | Performed by: PATHOLOGY

## 2024-05-20 PROCEDURE — 88305 TISSUE EXAM BY PATHOLOGIST: CPT | Mod: 26,,, | Performed by: PATHOLOGY

## 2024-05-20 PROCEDURE — 11104 PUNCH BX SKIN SINGLE LESION: CPT | Mod: S$GLB,,, | Performed by: DERMATOLOGY

## 2024-05-20 PROCEDURE — 1159F MED LIST DOCD IN RCRD: CPT | Mod: CPTII,S$GLB,, | Performed by: DERMATOLOGY

## 2024-05-20 NOTE — PROGRESS NOTES
Subjective:      Patient ID:  Aspen Cheng is a 28 y.o. female who presents for   Chief Complaint   Patient presents with    Lesion     R hand and R shin      HPI  28-year-old woman who presents as a new patient for evaluation of lesions, as well as evaluation of excessive sweating.     Lesion to right dorsal hand that has been present for about 1.5 years. Aside from gradual slow growth, lesion is asymptomatic. Denies lymphadenopathy, unintentional weight loss, fevers, chills, night sweats.   Lesion to right shin present for several years. Brown in color but otherwise completely asymptomatic.   Excessive sweating of the palms, soles, and armpits for the past several years. Thyroid testing within normal limits as of 10/2022. Never trialed any medications for this.     Review of Systems  Negative except as above in HPI.     Objective:   Physical Exam   Constitutional: She appears well-developed and well-nourished.   Neurological: She is alert and oriented to person, place, and time.   Psychiatric: She has a normal mood and affect.   Skin:   Areas Examined (abnormalities noted in diagram):   Chest / Axilla Inspection Performed  RUE Inspected  RLE Inspected  Gland Inspection Performed                     Diagram Legend     Erythematous scaling macule/papule c/w actinic keratosis       Vascular papule c/w angioma      Pigmented verrucoid papule/plaque c/w seborrheic keratosis      Yellow umbilicated papule c/w sebaceous hyperplasia      Irregularly shaped tan macule c/w lentigo     1-2 mm smooth white papules consistent with Milia      Movable subcutaneous cyst with punctum c/w epidermal inclusion cyst      Subcutaneous movable cyst c/w pilar cyst      Firm pink to brown papule c/w dermatofibroma      Pedunculated fleshy papule(s) c/w skin tag(s)      Evenly pigmented macule c/w junctional nevus     Mildly variegated pigmented, slightly irregular-bordered macule c/w mildly atypical nevus      Flesh colored to evenly  pigmented papule c/w intradermal nevus       Pink pearly papule/plaque c/w basal cell carcinoma      Erythematous hyperkeratotic cursted plaque c/w SCC      Surgical scar with no sign of skin cancer recurrence      Open and closed comedones      Inflammatory papules and pustules      Verrucoid papule consistent consistent with wart     Erythematous eczematous patches and plaques     Dystrophic onycholytic nail with subungual debris c/w onychomycosis     Umbilicated papule    Erythematous-base heme-crusted tan verrucoid plaque consistent with inflamed seborrheic keratosis     Erythematous Silvery Scaling Plaque c/w Psoriasis     See annotation      Assessment / Plan:      Summer was seen today for lesion.    Diagnoses and all orders for this visit:    Hyperhidrosis  -     aluminum chloride (DRYSOL) 20 % external solution; Apply topically every evening. To AA for excessive sweating  Patient with several year history of excessive sweating to palms, soles and axillae consistent with hyperhidrosis. Never trialed therapies in the past.   -Will start aluminum chloride 20% solution QHS  -If no improvement can consider systemic agent (glycopyrrolate, oxybutynin, etc)    Neoplasm of uncertain behavior of skin  -     Specimen to Pathology, Dermatology  Patient with blue papule to dorsal right hand most consistent with blue nevus. But due to recent growth and patient concern, will pursue punch biopsy to confirm. S/p procedure, which was tolerated well.   -Will contact patient with results of biopsy  -Pt to have sutures removed at her place of work () in 10-14 days     After informed verbal consent was obtained, using alcohol for cleansing and 2% Lidocaine with epinephrine for anesthetic, with sterile technique a 4 mm punch biopsy was used to obtain a biopsy specimen of the lesion. Hemostasis was obtained by pressure and wound was sutured with 4-0 Prolene. Vaseline dressing is applied, and wound care  instructions provided. Be alert for any signs of cutaneous infection. The specimen is labeled and sent to pathology for evaluation. The procedure was well tolerated without complications.     Dermatofibroma  -Present to right shin  -Etiology explained  -Reassurance provided     RTC 3-4 months to reassess response to aluminum chloride.     Patient staffed with attending physician, Dr. Dash. Attestation to follow.     Ryan Louie MD  [facility] Dermatology PGYII

## 2024-05-21 RX ORDER — ALUMINUM CHLORIDE 20 %
LOTION (ML) TOPICAL
Qty: 120 ML | Refills: 5 | Status: SHIPPED | OUTPATIENT
Start: 2024-05-21

## 2024-05-21 NOTE — PROGRESS NOTES
I have seen the patient, reviewed the Resident's progress note. I have personally interviewed and examined the patient at bedside and agree with the findings.     Nikole Dash MD  [facility]

## 2024-05-22 LAB
FINAL PATHOLOGIC DIAGNOSIS: NORMAL
GROSS: NORMAL
Lab: NORMAL
MICROSCOPIC EXAM: NORMAL

## 2024-06-27 DIAGNOSIS — R61 HYPERHIDROSIS: ICD-10-CM

## 2024-06-28 RX ORDER — ALUMINUM CHLORIDE 20 %
LOTION (ML) TOPICAL
Qty: 120 ML | Refills: 5 | Status: SHIPPED | OUTPATIENT
Start: 2024-06-28

## 2024-07-03 DIAGNOSIS — R61 HYPERHIDROSIS: ICD-10-CM

## 2024-07-03 RX ORDER — ALUMINUM CHLORIDE 20 %
LOTION (ML) TOPICAL
Qty: 120 ML | Refills: 5 | OUTPATIENT
Start: 2024-07-03

## 2024-10-24 ENCOUNTER — PATIENT MESSAGE (OUTPATIENT)
Dept: GASTROENTEROLOGY | Facility: CLINIC | Age: 29
End: 2024-10-24
Payer: COMMERCIAL

## 2025-01-14 ENCOUNTER — PATIENT MESSAGE (OUTPATIENT)
Dept: DERMATOLOGY | Facility: CLINIC | Age: 30
End: 2025-01-14
Payer: COMMERCIAL

## 2025-02-25 ENCOUNTER — PATIENT MESSAGE (OUTPATIENT)
Dept: DERMATOLOGY | Facility: CLINIC | Age: 30
End: 2025-02-25
Payer: COMMERCIAL

## 2025-02-28 ENCOUNTER — OFFICE VISIT (OUTPATIENT)
Dept: GASTROENTEROLOGY | Facility: CLINIC | Age: 30
End: 2025-02-28
Payer: COMMERCIAL

## 2025-02-28 VITALS
SYSTOLIC BLOOD PRESSURE: 116 MMHG | DIASTOLIC BLOOD PRESSURE: 83 MMHG | HEIGHT: 64 IN | HEART RATE: 68 BPM | BODY MASS INDEX: 31.96 KG/M2 | WEIGHT: 187.19 LBS

## 2025-02-28 DIAGNOSIS — K52.9 CHRONIC DIARRHEA: ICD-10-CM

## 2025-02-28 DIAGNOSIS — R10.13 EPIGASTRIC PAIN: Primary | ICD-10-CM

## 2025-02-28 PROCEDURE — 99999 PR PBB SHADOW E&M-EST. PATIENT-LVL III: CPT | Mod: PBBFAC,,, | Performed by: NURSE PRACTITIONER

## 2025-02-28 RX ORDER — OMEPRAZOLE 40 MG/1
40 CAPSULE, DELAYED RELEASE ORAL DAILY
Qty: 30 CAPSULE | Refills: 2 | Status: SHIPPED | OUTPATIENT
Start: 2025-02-28 | End: 2026-02-28

## 2025-02-28 NOTE — PROGRESS NOTES
Subjective:       Patient ID: Aspen Cheng is a 29 y.o. female.    Chief Complaint: Abdominal Pain and Diarrhea    28 y/o female presents to clinic with c/o abdominal pain and diarrhea. Last seen by [facility] NP Katelin Seymour in . Previously established with [facility] Dr. Rodriguez and had multiple EGD/colonoscopies. She had stretta procedure for reflux followed by a linx a few years later by Dr. Mayer at [facility]. Over the last 2 months she reports intermittent epigastric pain described as dull ache. Food feels stuck and difficult to pass. Worse after eating sushi and mini-chicken biscuits. No associated heartburn, regurgitation, nausea or vomiting. Reports ~40 lbs weight gain since linx procedure. Has not tried any medication for symptoms. Long history of chronic diarrhea. Diarrhea worse after cholecystectomy in . Has tried Imodium, Lomotil, Questran, and Colestid without relief and increased stomach pain. EGD and colonoscopy in  were normal with the exception of one colon polyp removed.           Past Medical History:   Diagnosis Date    GERD (gastroesophageal reflux disease)        Past Surgical History:   Procedure Laterality Date    APPENDECTOMY       SECTION      x2    CHOLECYSTECTOMY      COLONOSCOPY N/A 2023    Procedure: COLONOSCOPY;  Surgeon: Aaron Monaco MD;  Location: [facility];  Service: Endoscopy;  Laterality: N/A;    ESOPHAGOGASTRODUODENOSCOPY N/A 2023    Procedure: EGD (ESOPHAGOGASTRODUODENOSCOPY);  Surgeon: Aaron Monaco MD;  Location: [facility];  Service: Endoscopy;  Laterality: N/A;    HERNIA REPAIR      with LINX procedure    PELVIC LAPAROSCOPY         Family History   Problem Relation Name Age of Onset    Breast cancer Neg Hx      Colon cancer Neg Hx      Ovarian cancer Neg Hx         Social History     Socioeconomic History    Marital status:    Tobacco Use    Smoking status: Never    Smokeless tobacco: Never   Substance and Sexual Activity     "Alcohol use: Yes     Comment: socially    Drug use: No    Sexual activity: Yes     Partners: Male   Social History Narrative    ** Merged History Encounter **            Review of Systems   Constitutional:  Negative for appetite change and unexpected weight change.   HENT:  Negative for trouble swallowing.    Cardiovascular:  Negative for chest pain.   Gastrointestinal:  Positive for abdominal pain and diarrhea. Negative for blood in stool, nausea, rectal pain and vomiting.   Hematological:  Negative for adenopathy. Does not bruise/bleed easily.   Psychiatric/Behavioral:  Negative for dysphoric mood.          Objective:     Vitals:    02/28/25 0845   BP: 116/83   BP Location: Left arm   Patient Position: Sitting   Pulse: 68   Weight: 84.9 kg (187 lb 2.7 oz)   Height: 5' 4" (1.626 m)          Physical Exam  Constitutional:       General: She is not in acute distress.     Appearance: Normal appearance.   HENT:      Head: Normocephalic.   Eyes:      Conjunctiva/sclera: Conjunctivae normal.   Pulmonary:      Effort: Pulmonary effort is normal. No respiratory distress.   Musculoskeletal:         General: Normal range of motion.      Cervical back: Normal range of motion.   Skin:     General: Skin is warm and dry.   Neurological:      Mental Status: She is alert and oriented to person, place, and time.   Psychiatric:         Mood and Affect: Mood normal.         Behavior: Behavior normal.               Assessment:         ICD-10-CM ICD-9-CM   1. Epigastric pain  R10.13 789.06   2. Chronic diarrhea  K52.9 787.91       Plan:       Epigastric pain  -     [place] Upper GI; Future; Expected date: 02/28/2025  -     omeprazole (PRILOSEC) 40 MG capsule; Take 1 capsule (40 mg total) by mouth once daily.  Dispense: 30 capsule; Refill: 2    Chronic diarrhea         -    Trial fiber supplement daily; if no improvement          in 3 weeks start digestive enzymes with each meal    Follow up if symptoms worsen or fail to improve. "     Patient's Medications   New Prescriptions    OMEPRAZOLE (PRILOSEC) 40 MG CAPSULE    Take 1 capsule (40 mg total) by mouth once daily.   Previous Medications    TRIAMCINOLONE ACETONIDE 0.1% (KENALOG) 0.1 % CREAM    Apply topically 2 (two) times daily.   Modified Medications    No medications on file   Discontinued Medications    ALUMINUM CHLORIDE (BROMI-LOTION) 20 % LOTN    Apply QHS to hands and axillae prn sweating    AMITRIPTYLINE (ELAVIL) 25 MG TABLET    TK 1 T PO QHS    COLESTIPOL (COLESTID) 1 GRAM TAB    Take 1 g by mouth.    ESOMEPRAZOLE (NEXIUM) 20 MG CAPSULE    Take 20 mg by mouth.    MOTOFEN 1-0.025 MG TAB    TK 1 T PO  QID PRF DIARRHEA

## 2025-04-23 ENCOUNTER — PATIENT MESSAGE (OUTPATIENT)
Dept: DERMATOLOGY | Facility: CLINIC | Age: 30
End: 2025-04-23
Payer: COMMERCIAL

## 2025-04-23 ENCOUNTER — TELEPHONE (OUTPATIENT)
Dept: DERMATOLOGY | Facility: CLINIC | Age: 30
End: 2025-04-23
Payer: COMMERCIAL

## 2025-04-23 NOTE — TELEPHONE ENCOUNTER
----- Message from Nurse Comer sent at 4/22/2025  2:18 PM CDT -----    ----- Message -----  From: Idalmis Linton  Sent: 4/22/2025   2:18 PM CDT  To: Ekta Agustin Staff    :  Appointment RequestName of Caller:Summer S When is the first available appointment?No accessSymptoms:Recheck hyperhidrosis and discuss eczemaWould the patient rather a call back or a response via [facility]? Call back Best Call Back Number: [phone] Information: Pt states that she missed her appointment on 04/21 due to her having to go out of town due to a death in the family and she would like a call back to reschedule with the provider.

## 2025-05-05 ENCOUNTER — TELEPHONE (OUTPATIENT)
Dept: DERMATOLOGY | Facility: CLINIC | Age: 30
End: 2025-05-05
Payer: COMMERCIAL

## 2025-05-06 ENCOUNTER — OFFICE VISIT (OUTPATIENT)
Dept: DERMATOLOGY | Facility: CLINIC | Age: 30
End: 2025-05-06
Payer: COMMERCIAL

## 2025-05-06 DIAGNOSIS — L40.9 PSORIASIS: Primary | ICD-10-CM

## 2025-05-06 DIAGNOSIS — R61 HYPERHIDROSIS: ICD-10-CM

## 2025-05-06 DIAGNOSIS — L85.8 KP (KERATOSIS PILARIS): ICD-10-CM

## 2025-05-06 PROCEDURE — 1159F MED LIST DOCD IN RCRD: CPT | Mod: CPTII,S$GLB,, | Performed by: DERMATOLOGY

## 2025-05-06 PROCEDURE — 99214 OFFICE O/P EST MOD 30 MIN: CPT | Mod: S$GLB,,, | Performed by: DERMATOLOGY

## 2025-05-06 PROCEDURE — 99999 PR PBB SHADOW E&M-EST. PATIENT-LVL II: CPT | Mod: PBBFAC,,, | Performed by: DERMATOLOGY

## 2025-05-06 RX ORDER — FLUOCINONIDE TOPICAL SOLUTION USP, 0.05% 0.5 MG/ML
SOLUTION TOPICAL 2 TIMES DAILY
Qty: 60 ML | Refills: 4 | Status: SHIPPED | OUTPATIENT
Start: 2025-05-06

## 2025-05-06 RX ORDER — TRIAMCINOLONE ACETONIDE 1 MG/G
CREAM TOPICAL 2 TIMES DAILY
Qty: 80 G | Refills: 3 | Status: SHIPPED | OUTPATIENT
Start: 2025-05-06

## 2025-05-06 RX ORDER — PIMECROLIMUS 10 MG/G
CREAM TOPICAL 2 TIMES DAILY
Qty: 30 G | Refills: 3 | Status: SHIPPED | OUTPATIENT
Start: 2025-05-06

## 2025-05-06 NOTE — PROGRESS NOTES
Subjective:      Patient ID:  Aspen Cheng is a 29 y.o. female who presents for   Chief Complaint   Patient presents with    Rash    Follow-up     Rash - Follow-up  Symptom course: stable  Affected locations: currently clear  Signs / symptoms: asymptomatic  Severity: mild      Review of Systems   Skin:  Positive for rash.       1) hyperhidrosis armpits - was unable to tolerate drysol. Tried using every night, holding when shaving, decreasing use to weekly, still had rash and irritation.  2) rash on left medial eyelid, upper vaginal crease/mons pubis, and left inner ear which has been infected before.  3) rash on upper arms, bumpy, vaseline intensive care helps.    Objective:   Physical Exam   Constitutional: She appears well-developed and well-nourished. No distress.   Neurological: She is alert and oriented to person, place, and time. She is not disoriented.   Psychiatric: She has a normal mood and affect.   Skin:   Areas Examined (abnormalities noted in diagram):   Head / Face Inspection Performed  Neck Inspection Performed  Chest / Axilla Inspection Performed  Genitals / Buttocks / Groin Inspection Performed  RUE Inspected  LUE Inspection Performed            Diagram Legend     Erythematous scaling macule/papule c/w actinic keratosis       Vascular papule c/w angioma      Pigmented verrucoid papule/plaque c/w seborrheic keratosis      Yellow umbilicated papule c/w sebaceous hyperplasia      Irregularly shaped tan macule c/w lentigo     1-2 mm smooth white papules consistent with Milia      Movable subcutaneous cyst with punctum c/w epidermal inclusion cyst      Subcutaneous movable cyst c/w pilar cyst      Firm pink to brown papule c/w dermatofibroma      Pedunculated fleshy papule(s) c/w skin tag(s)      Evenly pigmented macule c/w junctional nevus     Mildly variegated pigmented, slightly irregular-bordered macule c/w mildly atypical nevus      Flesh colored to evenly pigmented papule c/w intradermal nevus        Pink pearly papule/plaque c/w basal cell carcinoma      Erythematous hyperkeratotic cursted plaque c/w SCC      Surgical scar with no sign of skin cancer recurrence      Open and closed comedones      Inflammatory papules and pustules      Verrucoid papule consistent consistent with wart     Erythematous eczematous patches and plaques     Dystrophic onycholytic nail with subungual debris c/w onychomycosis     Umbilicated papule    Erythematous-base heme-crusted tan verrucoid plaque consistent with inflamed seborrheic keratosis     Erythematous Silvery Scaling Plaque c/w Psoriasis     See annotation      Assessment / Plan:        Psoriasis - very mild but favor given distribution  Patient instructed to use an OTC medicated shampoo containing any of the following active ingredients: Selenium sulfide, Zinc Pyrithione, Tar, Salicylic acid, Ketoconazole). Patient should rotate the active ingredients to avoid building tolerance. Patient should allow shampoo to sit on scalp at least 5 minutes prior to rinsing and should wash hair a minimum of 2 times/week.     -     pimecrolimus (ELIDEL) 1 % cream; Apply topically 2 (two) times daily. For dry skin on eyelid  Dispense: 30 g; Refill: 3  -     fluocinonide (LIDEX) 0.05 % external solution; Apply topically 2 (two) times daily. Prn ear scaling  Dispense: 60 mL; Refill: 4    Hyperhidrosis  -     glycopyrronium tosylate 2.4 % Towl; Apply to armpits three times a week  Dispense: 30 each; Refill: 3  Sent to [facility]; if not covered [place]  KP (keratosis pilaris)  -     triamcinolone acetonide 0.1% (KENALOG) 0.1 % cream; Apply topically 2 (two) times daily. Prn bumpy rash flares on upper arms  Dispense: 80 g; Refill: 3  Continue daily emollients  The side effects of topical steroids were discussed, including cutaneous atrophy, hypopigmentation, and tachyphylaxis with prolonged use.  The patient was counseled to only use the topical steroids as long as necessary to treat the  condition, and then stop.             No follow-ups on file.3 mo virtual HH

## 2025-08-08 ENCOUNTER — TELEPHONE (OUTPATIENT)
Dept: DERMATOLOGY | Facility: CLINIC | Age: 30
End: 2025-08-08
Payer: COMMERCIAL

## 2025-08-11 ENCOUNTER — OFFICE VISIT (OUTPATIENT)
Dept: DERMATOLOGY | Facility: CLINIC | Age: 30
End: 2025-08-11
Payer: COMMERCIAL

## 2025-08-11 DIAGNOSIS — L40.9 PSORIASIS: Primary | ICD-10-CM

## 2025-08-11 DIAGNOSIS — R61 HYPERHIDROSIS: ICD-10-CM

## 2025-08-11 PROCEDURE — 98006 SYNCH AUDIO-VIDEO EST MOD 30: CPT | Mod: 95,,, | Performed by: DERMATOLOGY

## 2025-08-11 PROCEDURE — 1159F MED LIST DOCD IN RCRD: CPT | Mod: CPTII,95,, | Performed by: DERMATOLOGY

## 2025-08-11 PROCEDURE — 1160F RVW MEDS BY RX/DR IN RCRD: CPT | Mod: CPTII,95,, | Performed by: DERMATOLOGY

## 2025-08-11 PROCEDURE — G2211 COMPLEX E/M VISIT ADD ON: HCPCS | Mod: 95,,, | Performed by: DERMATOLOGY

## 2025-08-11 RX ORDER — FLUOCINOLONE ACETONIDE 0.11 MG/ML
OIL TOPICAL
Qty: 118.28 ML | Refills: 3 | Status: SHIPPED | OUTPATIENT
Start: 2025-08-11

## (undated) DEVICE — GLOVE SURGICAL LATEX SZ 7

## (undated) DEVICE — SUT MONOCRYL 4-0 PS-2

## (undated) DEVICE — PAD PREP 50/CA

## (undated) DEVICE — SEE MEDLINE ITEM 152622

## (undated) DEVICE — LINER GLOVE POWDERFREE SZ 7

## (undated) DEVICE — TRAY FOLEY 16FR INFECTION CONT

## (undated) DEVICE — Device

## (undated) DEVICE — GLOVE SURGICAL LATEX SZ 6.5

## (undated) DEVICE — TUBING INSUFFLATION 10

## (undated) DEVICE — SOL 9P NACL IRR PIC IL

## (undated) DEVICE — SEE MEDLINE ITEM 157117

## (undated) DEVICE — SUPPORT ULNA NERVE PROTECTOR

## (undated) DEVICE — SEE MEDLINE ITEM 154981

## (undated) DEVICE — TROCAR ENDOPATH XCEL 5X100MM

## (undated) DEVICE — PACK LAPAROSCOPY/PELVISCOPY II

## (undated) DEVICE — SYR 10CC LUER LOCK

## (undated) DEVICE — NDL HYPO REG 25G X 1 1/2

## (undated) DEVICE — NDL INSUF ULTRA VERESS 120MM

## (undated) DEVICE — ELECTRODE REM PLYHSV RETURN 9

## (undated) DEVICE — SHEARS HARMONIC 36CM HD 1000I

## (undated) DEVICE — KIT ANTIFOG

## (undated) DEVICE — CONTAINER SPECIMEN STRL 4OZ